# Patient Record
Sex: FEMALE | Race: WHITE | NOT HISPANIC OR LATINO | Employment: OTHER | URBAN - METROPOLITAN AREA
[De-identification: names, ages, dates, MRNs, and addresses within clinical notes are randomized per-mention and may not be internally consistent; named-entity substitution may affect disease eponyms.]

---

## 2017-01-03 ENCOUNTER — HOSPITAL ENCOUNTER (OUTPATIENT)
Dept: MAMMOGRAPHY | Facility: HOSPITAL | Age: 52
Discharge: HOME | End: 2017-01-03
Attending: FAMILY MEDICINE | Admitting: FAMILY MEDICINE

## 2017-01-03 DIAGNOSIS — Z12.31 VISIT FOR SCREENING MAMMOGRAM: ICD-10-CM

## 2017-01-03 PROCEDURE — 77063 BREAST TOMOSYNTHESIS BI: CPT | Performed by: RADIOLOGY

## 2017-01-03 PROCEDURE — 77063 BREAST TOMOSYNTHESIS BI: CPT

## 2017-01-03 PROCEDURE — 77067 SCR MAMMO BI INCL CAD: CPT | Performed by: RADIOLOGY

## 2017-01-03 PROCEDURE — G0202 SCR MAMMO BI INCL CAD: HCPCS

## 2017-01-20 ENCOUNTER — HOSPITAL ENCOUNTER (OUTPATIENT)
Dept: MAMMOGRAPHY | Facility: HOSPITAL | Age: 52
Discharge: HOME | End: 2017-01-20
Admitting: FAMILY MEDICINE

## 2017-01-20 ENCOUNTER — HOSPITAL ENCOUNTER (OUTPATIENT)
Dept: ULTRASOUND IMAGING | Facility: HOSPITAL | Age: 52
Discharge: HOME | End: 2017-01-20

## 2017-01-20 ENCOUNTER — TRANSCRIBE ORDERS (OUTPATIENT)
Dept: MAMMOGRAPHY | Facility: HOSPITAL | Age: 52
End: 2017-01-20

## 2017-01-20 DIAGNOSIS — R92.8 ABNORMAL MAMMOGRAM: ICD-10-CM

## 2017-01-20 DIAGNOSIS — R92.8 ABNORMAL MAMMOGRAM: Primary | ICD-10-CM

## 2017-01-20 PROCEDURE — 76642 ULTRASOUND BREAST LIMITED: CPT | Performed by: RADIOLOGY

## 2017-01-20 PROCEDURE — 77061 BREAST TOMOSYNTHESIS UNI: CPT | Performed by: RADIOLOGY

## 2017-01-20 PROCEDURE — 77065 DX MAMMO INCL CAD UNI: CPT | Performed by: RADIOLOGY

## 2017-01-20 PROCEDURE — 76642 ULTRASOUND BREAST LIMITED: CPT

## 2017-01-20 PROCEDURE — G0279 TOMOSYNTHESIS, MAMMO: HCPCS

## 2017-01-20 PROCEDURE — G0206 DX MAMMO INCL CAD UNI: HCPCS

## 2017-03-23 ENCOUNTER — OFFICE VISIT (OUTPATIENT)
Dept: PULMONOLOGY | Facility: CLINIC | Age: 52
End: 2017-03-23

## 2017-03-23 VITALS
HEIGHT: 67 IN | OXYGEN SATURATION: 99 % | WEIGHT: 160.2 LBS | BODY MASS INDEX: 25.15 KG/M2 | DIASTOLIC BLOOD PRESSURE: 80 MMHG | HEART RATE: 79 BPM | TEMPERATURE: 98.2 F | SYSTOLIC BLOOD PRESSURE: 118 MMHG | RESPIRATION RATE: 16 BRPM

## 2017-03-23 DIAGNOSIS — J38.3 VOCAL CORD DYSFUNCTION: ICD-10-CM

## 2017-03-23 DIAGNOSIS — J30.9 ALLERGIC RHINITIS, UNSPECIFIED ALLERGIC RHINITIS TRIGGER, UNSPECIFIED RHINITIS SEASONALITY: ICD-10-CM

## 2017-03-23 DIAGNOSIS — J45.30 MILD PERSISTENT ASTHMA WITHOUT COMPLICATION: Primary | ICD-10-CM

## 2017-03-23 PROCEDURE — 99213 OFFICE O/P EST LOW 20 MIN: CPT | Performed by: INTERNAL MEDICINE

## 2017-03-23 RX ORDER — PREDNISONE 20 MG/1
40 TABLET ORAL DAILY
Qty: 10 TABLET | Refills: 0 | Status: SHIPPED | OUTPATIENT
Start: 2017-03-23 | End: 2017-05-22

## 2017-03-23 NOTE — PROGRESS NOTES
"CHIEF COMPLAINT: f/u for Shortness of breath.     HISTORY OF PRESENT ILLNESS: The patient is a 51-year-old white female with history of fibromyalgia, allergic rhinitis, sinusitis, sinus surgery, osteoarthritis, asthma, irritable bowel syndrome who comes today for shortness of breath, cough, and allergies. These problems have been chronic and ongoing for a long time. Recently she reports a lot of sinus drainage and coughing. She also gets intermittent chest tightness and wheezing, and shortness of breath which is partially relieved by a rescue inhaler. She also feels like she has episodes that come on suddenly where her air gets cut-off suddenly more in her neck associated with voice changes. These tend to come and go suddenly. She also reports some intermittent heartburn, not currently on any treatment for that. The patient has a cat at home. She was also exposed to some sulfuric acid from computer batteries a couple of years ago, but she had breathing issues before then. No other complaints.     Interval History:  Last visit we changed her LABA/ICS to Breo, check a CBC for eosinophilia and IGE which were normal.  HRCT showed normal lung parenchyma.  She was referred to ENT at  for suspected Vocal Cord Dysfunction and this diagnosis was confirmed.  She has been doing well, she is going back to  for Speech Therapy.  She has only rarely had to use her rescue inhaler.  She did have one episode of \"bronchitis\" requiring steroids since last visit.    Past Medical, Family, Social History, Medications, Allergies, and Vitals Reviewed    ROS  All other systems were reviewed and negative except per HPI    Physical Exam   Constitutional: Oriented to person, place, and time. Appears well-developed and well-nourished.   Head: Normocephalic and atraumatic.   Nose: Nose normal.   Mouth/Throat: Oropharynx is clear and moist.   Eyes: Conjunctivae are normal.   Neck: No tracheal deviation present.   Cardiovascular: Normal rate, " "regular rhythm, normal heart sounds and intact distal pulses.  Exam reveals no gallop and no friction rub.    No murmur heard.  Pulmonary/Chest: Effort normal and breath sounds normal. No stridor. No respiratory distress. No wheezes. No rales. No tenderness.   Abdominal: Soft. Bowel sounds are normal. No distension. No tenderness. There is no guarding.   Musculoskeletal: Normal range of motion. No edema.   Lymphadenopathy:  No cervical adenopathy.   Neurological: Alert and oriented to person, place, and time.  No Focal Neurological Deficits Observed   Skin: Skin is warm and dry. No rash noted.   Psychiatric: Normal mood and affect.  Behavior is normal. Judgment normal.     HRCT reviewed and interpreted by me shows normal lung parenchyma    Impression & Plan    1)  Allergic Rhinitis - sees ENT, prior sinus surgery, on NSAID (doesn't seem to have a relationship w/ severity), on dymista, antihistamine, singulair    2)  Asthma - continue Breo, however since she is well controlled will decrease dose to 100.  Prescribe prednisone 40 mg x 5 days to have at home for \"action plan\".  CBC w/o eosinophilia, IGE normal.    3) Vocal Cord Dysfunction - started on PPI by  ENT, getting speech therapy    RTC 6 months    Fermín Hunter MD  Pulmonology and Critical Care Medicine  03/23/17 12:54 PM  Electronically Signed    "

## 2017-05-22 ENCOUNTER — OFFICE VISIT (OUTPATIENT)
Dept: GYNECOLOGIC ONCOLOGY | Facility: CLINIC | Age: 52
End: 2017-05-22

## 2017-05-22 VITALS
BODY MASS INDEX: 24.01 KG/M2 | HEIGHT: 67 IN | SYSTOLIC BLOOD PRESSURE: 108 MMHG | DIASTOLIC BLOOD PRESSURE: 66 MMHG | WEIGHT: 153 LBS | RESPIRATION RATE: 16 BRPM | HEART RATE: 77 BPM | OXYGEN SATURATION: 99 % | TEMPERATURE: 98.5 F

## 2017-05-22 DIAGNOSIS — Z01.419 WELL WOMAN EXAM WITH ROUTINE GYNECOLOGICAL EXAM: Primary | ICD-10-CM

## 2017-05-22 DIAGNOSIS — Z79.890 HORMONE REPLACEMENT THERAPY (HRT): ICD-10-CM

## 2017-05-22 PROCEDURE — 99396 PREV VISIT EST AGE 40-64: CPT | Performed by: NURSE PRACTITIONER

## 2017-05-22 RX ORDER — PAROXETINE HYDROCHLORIDE HEMIHYDRATE 37.5 MG/1
37.5 TABLET, FILM COATED, EXTENDED RELEASE ORAL EVERY MORNING
COMMUNITY

## 2017-05-22 RX ORDER — ESTRADIOL 2 MG/1
2 TABLET ORAL DAILY
Qty: 30 TABLET | Refills: 12 | Status: SHIPPED | OUTPATIENT
Start: 2017-05-22 | End: 2018-05-29

## 2017-07-21 ENCOUNTER — HOSPITAL ENCOUNTER (OUTPATIENT)
Dept: MAMMOGRAPHY | Facility: HOSPITAL | Age: 52
Discharge: HOME | End: 2017-07-21
Attending: FAMILY MEDICINE | Admitting: FAMILY MEDICINE

## 2017-07-21 DIAGNOSIS — R92.8 ABNORMAL MAMMOGRAM: ICD-10-CM

## 2017-07-21 PROCEDURE — 77065 DX MAMMO INCL CAD UNI: CPT | Performed by: RADIOLOGY

## 2017-07-21 PROCEDURE — G0279 TOMOSYNTHESIS, MAMMO: HCPCS

## 2017-07-21 PROCEDURE — 77061 BREAST TOMOSYNTHESIS UNI: CPT | Performed by: RADIOLOGY

## 2017-07-21 PROCEDURE — G0206 DX MAMMO INCL CAD UNI: HCPCS

## 2017-09-25 ENCOUNTER — OFFICE VISIT (OUTPATIENT)
Dept: PULMONOLOGY | Facility: CLINIC | Age: 52
End: 2017-09-25

## 2017-09-25 VITALS
WEIGHT: 158 LBS | HEIGHT: 67 IN | HEART RATE: 81 BPM | BODY MASS INDEX: 24.8 KG/M2 | TEMPERATURE: 98.1 F | OXYGEN SATURATION: 99 % | SYSTOLIC BLOOD PRESSURE: 100 MMHG | RESPIRATION RATE: 16 BRPM | DIASTOLIC BLOOD PRESSURE: 62 MMHG

## 2017-09-25 DIAGNOSIS — J45.30 MILD PERSISTENT ASTHMA WITHOUT COMPLICATION: ICD-10-CM

## 2017-09-25 DIAGNOSIS — J30.9 ALLERGIC RHINITIS, UNSPECIFIED ALLERGIC RHINITIS TRIGGER, UNSPECIFIED RHINITIS SEASONALITY: Primary | ICD-10-CM

## 2017-09-25 PROCEDURE — 86003 ALLG SPEC IGE CRUDE XTRC EA: CPT | Performed by: INTERNAL MEDICINE

## 2017-09-25 PROCEDURE — 36415 COLL VENOUS BLD VENIPUNCTURE: CPT | Performed by: INTERNAL MEDICINE

## 2017-09-25 PROCEDURE — 90471 IMMUNIZATION ADMIN: CPT | Performed by: INTERNAL MEDICINE

## 2017-09-25 PROCEDURE — 90686 IIV4 VACC NO PRSV 0.5 ML IM: CPT | Performed by: INTERNAL MEDICINE

## 2017-09-25 PROCEDURE — 99214 OFFICE O/P EST MOD 30 MIN: CPT | Performed by: INTERNAL MEDICINE

## 2017-09-25 RX ORDER — OMEPRAZOLE 20 MG/1
1 CAPSULE, DELAYED RELEASE ORAL DAILY
COMMUNITY
Start: 2017-09-03 | End: 2018-05-29

## 2017-09-25 RX ORDER — ALBUTEROL SULFATE 90 UG/1
2 POWDER, METERED RESPIRATORY (INHALATION) EVERY 6 HOURS PRN
COMMUNITY
Start: 2017-06-21 | End: 2017-09-25 | Stop reason: SDUPTHER

## 2017-09-25 RX ORDER — MECLIZINE HYDROCHLORIDE 25 MG/1
25 TABLET ORAL 3 TIMES DAILY PRN
Qty: 90 TABLET | Refills: 0 | Status: SHIPPED | OUTPATIENT
Start: 2017-09-25 | End: 2018-05-29

## 2017-09-25 RX ORDER — TRIAMTERENE AND HYDROCHLOROTHIAZIDE 37.5; 25 MG/1; MG/1
1 CAPSULE ORAL DAILY
COMMUNITY
Start: 2017-09-03

## 2017-09-25 RX ORDER — FLUTICASONE PROPIONATE 50 MCG
SPRAY, SUSPENSION (ML) NASAL
Qty: 1 BOTTLE | Refills: 11 | Status: SHIPPED | OUTPATIENT
Start: 2017-09-25

## 2017-09-25 RX ORDER — ALBUTEROL SULFATE 90 UG/1
2 POWDER, METERED RESPIRATORY (INHALATION) EVERY 4 HOURS PRN
Qty: 1 INHALER | Refills: 11 | Status: SHIPPED | OUTPATIENT
Start: 2017-09-25

## 2017-09-25 RX ORDER — AZELASTINE HCL 205.5 UG/1
SPRAY NASAL
Qty: 1 EACH | Refills: 11 | Status: SHIPPED | OUTPATIENT
Start: 2017-09-25 | End: 2018-09-24 | Stop reason: SDUPTHER

## 2017-09-25 RX ORDER — MONTELUKAST SODIUM 10 MG/1
10 TABLET ORAL NIGHTLY
Qty: 30 TABLET | Refills: 11 | Status: SHIPPED | OUTPATIENT
Start: 2017-09-25

## 2017-09-25 NOTE — PROGRESS NOTES
CHIEF COMPLAINT: f/u for Asthma / Allergies / Vocal Cord Dysfunction     HISTORY OF PRESENT ILLNESS: The patient is a 51-year-old white female with history of fibromyalgia, allergic rhinitis, sinusitis, sinus surgery, osteoarthritis, asthma, irritable bowel syndrome who comes today for shortness of breath, cough, and allergies. These problems have been chronic and ongoing for a long time. Recently she reports a lot of sinus drainage and coughing. She also gets intermittent chest tightness and wheezing, and shortness of breath which is partially relieved by a rescue inhaler. She also feels like she has episodes that come on suddenly where her air gets cut-off suddenly more in her neck associated with voice changes. These tend to come and go suddenly. She also reports some intermittent heartburn, not currently on any treatment for that. The patient has a cat at home. She was also exposed to some sulfuric acid from computer batteries a couple of years ago, but she had breathing issues before then. No other complaints.     She had a CBC for eosinophilia and IGE which were normal.  HRCT showed normal lung parenchyma.  She was referred to ENT at  for suspected Vocal Cord Dysfunction and this diagnosis was confirmed.  She completed speech therapy.    INTERVAL HISTORY:    Patient returns for follow up.  She no longer has symptoms of VCD.  She is taking a PPI as prescribed by ENT.  She recently ran out of dymista and is complaining of nasal congestion, sneezing, coughing (worse at night), congestion and vertigo (which she tends to get when her sinuses act up).  She has also had some wheezing and used her rescue inhaler once.  She is interested in allergy shots.  No other complaints.    Past Medical, Family, Social History, Medications, Allergies, and Vitals Reviewed    ROS  All other systems were reviewed and negative except per HPI    Physical Exam   Constitutional: Oriented to person, place, and time. Appears  well-developed and well-nourished.   Head: Normocephalic and atraumatic.   Nose: Nose normal.   Mouth/Throat: Oropharynx is clear and moist.   Eyes: Conjunctivae are normal.   Neck: No tracheal deviation present.   Cardiovascular: Normal rate, regular rhythm, normal heart sounds and intact distal pulses.  Exam reveals no gallop and no friction rub.    No murmur heard.  Pulmonary/Chest: Effort normal and breath sounds normal. No stridor. No respiratory distress. No wheezes. No rales. No tenderness.   Abdominal: Soft. Bowel sounds are normal. No distension. No tenderness. There is no guarding.   Musculoskeletal: Normal range of motion. No edema.   Lymphadenopathy:  No cervical adenopathy.   Neurological: Alert and oriented to person, place, and time.  No Focal Neurological Deficits Observed   Skin: Skin is warm and dry. No rash noted.   Psychiatric: Normal mood and affect.  Behavior is normal. Judgment normal.     DIAGNOSTIC DATA (Reviewed and Interpreted by me unless otherwise specified):    HRCT 11/14/16 normal lung parenchyma    PFT 11/4/16 mild restriciton, borderline mild reduction in dlco overcorrects for alveolar volume, poor gas mixing    Impression & Plan    1)  Allergic Rhinitis - sees ENT, prior sinus surgery, will refill anti-histamine / singulair / flonase / azelastine.  Will check a serum allergy panel and refer to allergy.  If no improvement my need re-evaluation for sinus surgery.      2)  Asthma - continue Breo 100, generally well controlled with rare use of rescue inhaler    3) Vocal Cord Dysfunction - started on PPI by  ENT, completed speech therapy.  Rarely symptomatic.    4) Vertigo - likely related to allergic rhinitis and eustachian tube dysfunction, prn meclizine    Flu shot today    RTC 3 months    Fermín Hunter MD  Pulmonology and Critical Care Medicine  09/25/17 1:33 PM  Electronically Signed

## 2017-09-28 LAB
A ALTERNATA IGE QN: <0.1 KU/L
A FUMIGATUS IGE QN: <0.1 KU/L
AMER ROACH IGE QN: <0.1 KU/L
BAHIA GRASS IGE QN: <0.1 KU/L
BERMUDA GRASS IGE QN: <0.1 KU/L
BOXELDER IGE QN: <0.1 KU/L
C HERBARUM IGE QN: <0.1 KU/L
CAT DANDER IGG QN: <0.1 KU/L
CMN PIGWEED IGE QN: <0.1 KU/L
COMMON RAGWEED IGE QN: <0.1 KU/L
CONV CLASS DESCRIPTION: NORMAL
D FARINAE IGE QN: <0.1 KU/L
D PTERONYSS IGE QN: <0.1 KU/L
DOG DANDER IGE QN: <0.1 KU/L
ENGL PLANTAIN IGE QN: <0.1 KU/L
HAZELNUT POLN IGE QN: <0.1 KU/L
JOHNSON GRASS IGE QN: <0.1 KU/L
KENT BLUE GRASS IGE QN: <0.1 KU/L
M RACEMOSUS IGE QN: <0.1 KU/L
MT JUNIPER IGE QN: <0.1 KU/L
MUGWORT IGE QN: <0.1 KU/L
NETTLE IGE QN: <0.1 KU/L
P NOTATUM IGE QN: <0.1 KU/L
S BOTRYOSUM IGE QN: <0.1 KU/L
SHEEP SORREL IGE QN: <0.1 KU/L
SWEET GUM IGE QN: <0.1 KU/L
T011-IGE MAPLE LEAF SYCAMORE: <0.1 KU/L
WHITE ELM IGE QN: <0.1 KU/L
WHITE HICKORY IGE QN: <0.1 KU/L
WHITE MULBERRY IGE QN: <0.1 KU/L
WHITE OAK IGE QN: <0.1 KU/L

## 2018-03-05 ENCOUNTER — TRANSCRIBE ORDERS (OUTPATIENT)
Dept: ADMINISTRATIVE | Facility: HOSPITAL | Age: 53
End: 2018-03-05

## 2018-03-05 DIAGNOSIS — Z12.31 VISIT FOR SCREENING MAMMOGRAM: Primary | ICD-10-CM

## 2018-03-16 ENCOUNTER — TRANSCRIBE ORDERS (OUTPATIENT)
Dept: ADMINISTRATIVE | Facility: HOSPITAL | Age: 53
End: 2018-03-16

## 2018-03-16 DIAGNOSIS — R19.03 RIGHT LOWER QUADRANT ABDOMINAL MASS: Primary | ICD-10-CM

## 2018-03-21 ENCOUNTER — HOSPITAL ENCOUNTER (OUTPATIENT)
Dept: MAMMOGRAPHY | Facility: HOSPITAL | Age: 53
Discharge: HOME | End: 2018-03-21
Attending: FAMILY MEDICINE

## 2018-03-29 ENCOUNTER — HOSPITAL ENCOUNTER (OUTPATIENT)
Dept: ULTRASOUND IMAGING | Facility: HOSPITAL | Age: 53
Discharge: HOME | End: 2018-03-29
Attending: FAMILY MEDICINE | Admitting: FAMILY MEDICINE

## 2018-03-29 DIAGNOSIS — R19.03 RIGHT LOWER QUADRANT ABDOMINAL MASS: ICD-10-CM

## 2018-03-29 PROCEDURE — 76705 ECHO EXAM OF ABDOMEN: CPT

## 2018-04-05 ENCOUNTER — HOSPITAL ENCOUNTER (OUTPATIENT)
Dept: MAMMOGRAPHY | Facility: HOSPITAL | Age: 53
Discharge: HOME | End: 2018-04-05
Attending: FAMILY MEDICINE | Admitting: FAMILY MEDICINE

## 2018-04-05 DIAGNOSIS — Z12.31 VISIT FOR SCREENING MAMMOGRAM: ICD-10-CM

## 2018-04-05 PROCEDURE — 77063 BREAST TOMOSYNTHESIS BI: CPT | Performed by: RADIOLOGY

## 2018-04-05 PROCEDURE — 77067 SCR MAMMO BI INCL CAD: CPT | Performed by: RADIOLOGY

## 2018-04-05 PROCEDURE — 77067 SCR MAMMO BI INCL CAD: CPT

## 2018-04-05 PROCEDURE — 77063 BREAST TOMOSYNTHESIS BI: CPT

## 2018-04-17 ENCOUNTER — HOSPITAL ENCOUNTER (OUTPATIENT)
Dept: MAMMOGRAPHY | Facility: HOSPITAL | Age: 53
Discharge: HOME | End: 2018-04-17
Admitting: FAMILY MEDICINE

## 2018-04-17 DIAGNOSIS — R92.8 ABNORMAL MAMMOGRAM: ICD-10-CM

## 2018-04-17 PROCEDURE — 77062 BREAST TOMOSYNTHESIS BI: CPT | Performed by: RADIOLOGY

## 2018-04-17 PROCEDURE — G0279 TOMOSYNTHESIS, MAMMO: HCPCS

## 2018-04-17 PROCEDURE — 77066 DX MAMMO INCL CAD BI: CPT

## 2018-04-17 PROCEDURE — 77066 DX MAMMO INCL CAD BI: CPT | Performed by: RADIOLOGY

## 2018-04-18 ENCOUNTER — APPOINTMENT (OUTPATIENT)
Dept: MAMMOGRAPHY | Facility: HOSPITAL | Age: 53
End: 2018-04-18

## 2018-05-29 ENCOUNTER — OFFICE VISIT (OUTPATIENT)
Dept: GYNECOLOGIC ONCOLOGY | Facility: CLINIC | Age: 53
End: 2018-05-29

## 2018-05-29 VITALS
OXYGEN SATURATION: 98 % | DIASTOLIC BLOOD PRESSURE: 52 MMHG | WEIGHT: 160 LBS | HEART RATE: 83 BPM | HEIGHT: 67 IN | TEMPERATURE: 97.7 F | RESPIRATION RATE: 16 BRPM | SYSTOLIC BLOOD PRESSURE: 90 MMHG | BODY MASS INDEX: 25.11 KG/M2

## 2018-05-29 DIAGNOSIS — Z01.419 WELL WOMAN EXAM WITH ROUTINE GYNECOLOGICAL EXAM: Primary | ICD-10-CM

## 2018-05-29 DIAGNOSIS — Z79.890 HORMONE REPLACEMENT THERAPY (HRT): ICD-10-CM

## 2018-05-29 PROCEDURE — 99396 PREV VISIT EST AGE 40-64: CPT | Performed by: NURSE PRACTITIONER

## 2018-05-29 RX ORDER — ESTERIFIED ESTROGEN AND METHYLTESTOSTERONE .625; 1.25 MG/1; MG/1
1 TABLET ORAL DAILY
Qty: 30 TABLET | Refills: 5 | Status: SHIPPED | OUTPATIENT
Start: 2018-05-29 | End: 2018-12-04 | Stop reason: SDUPTHER

## 2018-05-29 NOTE — PROGRESS NOTES
GYN ONCOLOGY ANNUAL WELL WOMAN VISIT      Domo Anderson  [id number]  1965      Chief Complaint: Gynecologic Exam (wants to switch back to estratest due to night sweats and decreased libido. )        History of present illness:  Domo Anderson is a 53 y.o. year old female who is here today for an annual exam. She has a personal history of AUB and POP, s/p AD/BSO/ASC/[place]aly in  and laparoscopic CYN with mesh revision in . She took Estratest for many years following surgery, but last year requested to change her HRT due to c/o ongoing night sweats and did not feel she needed the testosterone component due to pending divorce. She was changed to Estrace 2 mg PO daily.  Upon arrival today, she requests to change back to Estratest. Her divorce should soon be finalized and she is in a new relationship. She did have STD testing after finding out about multiple affairs, which was all negative. Her grown children are very supportive.   Otherwise, she is feeling very well today. She denies vaginal bleeding, pelvic pain, concerning lesions, breast concerns, or changes in bowel or bladder function. All well woman screenings are currently UTD.         Obstetric History:  OB History      Para Term  AB Living    4 3 3          SAB TAB Ectopic Molar Multiple Live Births                        Menstrual History:     No LMP recorded. Patient has had a hysterectomy.          Past Medical History:   Diagnosis Date   • Allergic rhinitis    • Anxiety    • Asthma    • Fibromyalgia    • Hx of uterine prolapse    • Hyperlipidemia    • IBS (irritable bowel syndrome)    • Osteoarthritis    • Vocal cord dysfunction        Past Surgical History:   Procedure Laterality Date   • ABDOMINAL SURGERY      hysterectomy   • AUGMENTATION MAMMAPLASTY Bilateral     SALINE   • HYSTERECTOMY      AGE 38   • OOPHORECTOMY Bilateral     AGE 38   • SINUS SURGERY Right     deviated septum   • VAGINAL MESH REVISION    "      MEDICATIONS: The current medication list was reviewed and reconciled.     Allergies:  has No Known Allergies.    Family History   Problem Relation Age of Onset   • Ovarian cancer Neg Hx    • Breast cancer Neg Hx        Health Maintenance:  Last mammogram was 4/2018. Last colonoscopy was 3/2015, with recommended follow-up in 5 year(s). Last DEXA was 2014. Last pap smear was 2017, results were  normal PAP..      Review of Systems   Constitutional: Negative for fatigue, fever and unexpected weight change.   HENT: Negative for congestion, ear pain, hearing loss, sinus pressure and trouble swallowing.    Eyes: Negative for visual disturbance.   Respiratory: Negative for cough, chest tightness, shortness of breath and wheezing.    Cardiovascular: Negative for chest pain, palpitations and leg swelling.   Gastrointestinal: Negative for abdominal distention, abdominal pain, constipation, diarrhea, nausea and vomiting.   Endocrine: Negative for cold intolerance, heat intolerance, polydipsia, polyphagia and polyuria.   Genitourinary: Negative for difficulty urinating, dyspareunia, dysuria, frequency, hematuria, pelvic pain, urgency, vaginal bleeding, vaginal discharge and vaginal pain.   Musculoskeletal: Negative for arthralgias, gait problem, joint swelling and myalgias.   Skin: Negative for color change, pallor and rash.   Neurological: Negative for dizziness, seizures, syncope, weakness, light-headedness, numbness and headaches.   Hematological: Negative for adenopathy. Does not bruise/bleed easily.   Psychiatric/Behavioral: Negative for agitation, confusion, sleep disturbance and suicidal ideas. The patient is not nervous/anxious.        Physical Exam  Vital Signs: BP 90/52   Pulse 83   Temp 97.7 °F (36.5 °C) (Oral)   Resp 16   Ht 170.2 cm (67\")   Wt 72.6 kg (160 lb)   SpO2 98%   BMI 25.06 kg/m²    General Appearance:  alert, cooperative, no apparent distress, appears stated age and normal weight "   Neurologic/Psychiatric: A&O x 3, gait steady, appropriate affect   HEENT:  Normocephalic, without obvious abnormality, mucous membranes moist   Neck: Supple, symmetrical, trachea midline, no adenopathy;  No thyromegaly, masses, or tenderness   Back:   Symmetric, no curvature, ROM normal, no CVA tenderness   Lungs:   Clear to auscultation bilaterally; respirations regular, even, and unlabored bilaterally   Heart:  Regular rate and rhythm, no murmurs appreciated   Breasts:  Symmetrical, no masses, no lesions, no nipple discharge and implants noted bilaterally   Abdomen:   Soft, non-tender, non-distended and no organomegaly   Lymph nodes: No cervical, supraclavicular, inguinal or axillary adenopathy noted   Extremities: Normal, atraumatic; no clubbing, cyanosis, or edema    Skin: No rashes, ulcers, or suspicious lesions noted   Pelvic: External Genitalia  without lesions or skin changes  Vagina  is pink, moist, without lesions.   Vaginal Cuff  Female Vaginal Cuff: smooth, intact, without visible lesions and pap obtained  Uterus  surgically absent  Ovaries  surgically absent bilaterallly  Parametria  smooth  Rectovaginal  Female rectovaginal: deferred         Procedure Note:  No notes on file    Assessment and Plan:    Domo was seen today for gynecologic exam.    Diagnoses and all orders for this visit:    Well woman exam with routine gynecological exam  -     Pap IG, Rfx HPV ASCU; Future    Hormone replacement therapy (HRT)    Other orders  -     estrogens, conjugated,-methyltestosterone (ESTRATEST HS) 0.625-1.25 MG per tablet; Take 1 tablet by mouth Daily.        Pap was done today.  If she does not receive the results of the Pap within 2 weeks  time, she was instructed to call to find out the results.  I explained to Domo that the recommendations for Pap smear interval in a low risk patient's has lengthened to 3 years time.  I encouraged her to be seen yearly for a full physical exam including breast and pelvic  exam even during the off years when PAP's will not be performed.    She was encouraged to get yearly mammograms.  She should report any palpable breast lump(s) or skin changes regardless of mammographic findings.  I explained to Domo that notification regarding her mammogram results will come from the center performing the study.  Our office will not be routinely calling with mammogram results.  It is her responsibility to make sure that the results from the mammogram are communicated to her by the breast center.  If she has any questions about the results, she is welcome to call our office anytime.    Repeat colonoscopy in 2020. Repeat DEXA at age 58.     HRT changed back to Estratest per patient request. If happy with this, may call for refills in 6 months.     RTC 1 year for Annual exam or PRN.      DANYEL Bush      Note: Speech recognition transcription software was used to dictate portions of this document.  An attempt at proofreading has been made though minor errors in transcription may still be present.  Please do not hesitate to call our office with any questions.

## 2018-07-02 ENCOUNTER — TRANSCRIBE ORDERS (OUTPATIENT)
Dept: PULMONOLOGY | Facility: CLINIC | Age: 53
End: 2018-07-02

## 2018-07-30 ENCOUNTER — OFFICE VISIT (OUTPATIENT)
Dept: PULMONOLOGY | Facility: CLINIC | Age: 53
End: 2018-07-30

## 2018-07-30 VITALS
SYSTOLIC BLOOD PRESSURE: 110 MMHG | WEIGHT: 170.2 LBS | HEIGHT: 67 IN | BODY MASS INDEX: 26.71 KG/M2 | DIASTOLIC BLOOD PRESSURE: 72 MMHG | HEART RATE: 67 BPM | RESPIRATION RATE: 18 BRPM | OXYGEN SATURATION: 97 % | TEMPERATURE: 97.7 F

## 2018-07-30 DIAGNOSIS — J45.31 MILD PERSISTENT ASTHMA WITH ACUTE EXACERBATION: Primary | ICD-10-CM

## 2018-07-30 DIAGNOSIS — J30.9 ALLERGIC RHINITIS, UNSPECIFIED CHRONICITY, UNSPECIFIED SEASONALITY, UNSPECIFIED TRIGGER: ICD-10-CM

## 2018-07-30 DIAGNOSIS — J38.3 VOCAL CORD DYSFUNCTION: ICD-10-CM

## 2018-07-30 PROCEDURE — 99214 OFFICE O/P EST MOD 30 MIN: CPT | Performed by: NURSE PRACTITIONER

## 2018-07-30 RX ORDER — AZITHROMYCIN 250 MG/1
TABLET, FILM COATED ORAL
COMMUNITY
Start: 2018-07-27 | End: 2018-12-05

## 2018-07-30 RX ORDER — METHYLPREDNISOLONE 4 MG/1
TABLET ORAL
COMMUNITY
Start: 2018-07-27 | End: 2018-12-05

## 2018-07-30 RX ORDER — LEVOFLOXACIN 500 MG/1
TABLET, FILM COATED ORAL
COMMUNITY
Start: 2018-06-25 | End: 2018-12-05

## 2018-07-30 RX ORDER — ALBUTEROL SULFATE 1.25 MG/3ML
SOLUTION RESPIRATORY (INHALATION)
COMMUNITY
Start: 2018-07-27

## 2018-07-30 NOTE — PROGRESS NOTES
[facility] Pulmonary Follow up    CHIEF COMPLAINT    Asthma, worsening shortness of breath    HISTORY OF PRESENT ILLNESS    Domo Anderson is a 53 y.o.female lifetime nonsmoker with a history of asthma, allergic rhinitis, and vocal cord dysfunction here today for worsening shortness of breath.    She last saw Dr. Fermín Hunter in September 2017.  She was doing fairly well until about a month ago.  She saw her PCP when she was having asthma attack.  She received antibiotics, steroids, and a nebulizer treatment in the office.  She returned to her PCP last week and was told she is having another exacerbation. She was prescribed another round of prednisone as well as erythromycin.  Her PCP arranged for her to have a nebulizer machine and nebulized albuterol for home use.  Despite antibiotics, steroids, and neb treatments, she continues to complain of shortness of breath, chest tightness, and a nonproductive cough.  She notes that her cough is worse at night.  She denies any symptoms of reflux.    She remains on Breo, Singulair, Zyrtec, Flonase, and Astepro.  She was evaluated by the allergist a week or so ago but was unable to have allergy testing performed while she was still on her antihistamine.  She does not feel like her congestion or drainage is worse in the last month.    She has a history of vocal cord dysfunction.  She was seen by  ENT and completed speech therapy.  She feels like her shortness of breath she is currently experiencing is worse than what she felt with her vocal cord dysfunction.      Patient Active Problem List   Diagnosis   • Mild persistent asthma with acute exacerbation   • Allergic rhinitis   • Vocal cord dysfunction       No Known Allergies    Current Outpatient Prescriptions:   •  albuterol (ACCUNEB) 1.25 MG/3ML nebulizer solution, , Disp: , Rfl:   •  azelastine (ASTEPRO) 0.15 % solution nasal spray, 2 sprays q nostril bid, Disp: 1 each, Rfl: 11  •  azithromycin (ZITHROMAX) 250 MG tablet,  , Disp: , Rfl:   •  celecoxib (CeleBREX) 200 MG capsule, Take 200 mg by mouth 2 (Two) Times a Day., Disp: , Rfl:   •  Cetirizine HCl 10 MG capsule, Take  by mouth Daily., Disp: , Rfl:   •  estrogens, conjugated,-methyltestosterone (ESTRATEST HS) 0.625-1.25 MG per tablet, Take 1 tablet by mouth Daily., Disp: 30 tablet, Rfl: 5  •  fluticasone (FLONASE) 50 MCG/ACT nasal spray, 2 sprays in each nostril bid, Disp: 1 bottle, Rfl: 11  •  Fluticasone Furoate-Vilanterol 100-25 MCG/INH aerosol powder , Inhale 1 puff Daily., Disp: 1 each, Rfl: 11  •  levoFLOXacin (LEVAQUIN) 500 MG tablet, , Disp: , Rfl:   •  Linaclotide (LINZESS) 145 MCG capsule, Take 1 capsule by mouth Daily., Disp: , Rfl:   •  MethylPREDNISolone (MEDROL, PHILIP,) 4 MG tablet, , Disp: , Rfl:   •  montelukast (SINGULAIR) 10 MG tablet, Take 1 tablet by mouth Every Night., Disp: 30 tablet, Rfl: 11  •  PARoxetine CR (PAXIL-CR) 37.5 MG 24 hr tablet, Take 37.5 mg by mouth Every Morning., Disp: , Rfl:   •  PROAIR RESPICLICK 108 (90 Base) MCG/ACT inhaler, Inhale 2 puffs Every 4 (Four) Hours As Needed for Wheezing or Shortness of Air., Disp: 1 inhaler, Rfl: 11  •  traZODone (DESYREL) 50 MG tablet, Take 50 mg by mouth Every Night., Disp: , Rfl:   •  triamterene-hydrochlorothiazide (DYAZIDE) 37.5-25 MG per capsule, Take 1 capsule by mouth Daily., Disp: , Rfl:   •  Tiotropium Bromide Monohydrate (SPIRIVA RESPIMAT) 1.25 MCG/ACT aerosol solution inhaler, Inhale 2 puffs Daily., Disp: 1 inhaler, Rfl: 1  MEDICATION LIST AND ALLERGIES REVIEWED.    Social History   Substance Use Topics   • Smoking status: Never Smoker   • Smokeless tobacco: Never Used   • Alcohol use Yes      Comment: Socially       FAMILY AND SOCIAL HISTORY REVIEWED.    Review of Systems   Constitutional: Negative for chills, fatigue, fever and unexpected weight change.   HENT: Positive for congestion. Negative for nosebleeds, postnasal drip, rhinorrhea, sinus pressure and trouble swallowing.    Respiratory:  "Positive for cough, chest tightness, shortness of breath and wheezing.    Cardiovascular: Negative for chest pain and leg swelling.   Gastrointestinal: Negative for abdominal pain, constipation, diarrhea, nausea and vomiting.   Genitourinary: Negative for dysuria, frequency, hematuria and urgency.   Musculoskeletal: Negative for myalgias.   Neurological: Negative for dizziness, weakness, numbness and headaches.   All other systems reviewed and are negative.  .    /72   Pulse 67   Temp 97.7 °F (36.5 °C)   Resp 18   Ht 170.2 cm (67\")   Wt 77.2 kg (170 lb 3.2 oz)   SpO2 97% Comment: RA  BMI 26.66 kg/m²     Immunization History   Administered Date(s) Administered   • Influenza, Unspecified 09/25/2017       Physical Exam   Constitutional: She is oriented to person, place, and time. No distress.   HENT:   Head: Normocephalic and atraumatic.   Neck: Neck supple. No tracheal deviation present.   Cardiovascular: Normal rate and regular rhythm.    No murmur heard.  Pulmonary/Chest: Effort normal. No stridor. No respiratory distress. She has no wheezes. She has no rales.   Musculoskeletal: Normal range of motion. She exhibits no edema.   Lymphadenopathy:     She has no cervical adenopathy.   Neurological: She is alert and oriented to person, place, and time.   Skin: Skin is dry.   Psychiatric: She has a normal mood and affect. Her behavior is normal.   Vitals reviewed.        RESULTS        PROBLEM LIST    Problem List Items Addressed This Visit        Respiratory    Allergic rhinitis    Vocal cord dysfunction       Other    Mild persistent asthma with acute exacerbation - Primary            DISCUSSION    Asthma- continue Breo and albuterol.  Complete antibiotic and prednisone as prescribed by PCP.  I do not hear any wheezing today to suggest she needs a prolonged course of steroids.  We discussed possibly increasing her ICS dose versus adding on Spiriva asthma dose.  We'll try adding Spiriva.  She was provided " with samples and educated on use of the inhaler.    Allergic rhinitis- follow-up with allergist as scheduled.    Vocal cord dysfunction- she has completed speech therapy.  She was on a PPI but only took this for about one month.  Could consider resuming PPI if cough persists.     I spent 25 minutes with the patient. I spent > 50% percent of this time counseling and discussing diagnosis, current status and treatment options.    DANYEL Collado  07/30/20182:17 PM  Electronically signed     Please note that portions of this note were completed with a voice recognition program. Efforts were made to edit the dictations, but occasionally words are mistranscribed.      CC: Andrews Castellon MD

## 2018-09-24 RX ORDER — AZELASTINE HCL 205.5 UG/1
SPRAY NASAL
Qty: 30 ML | Refills: 11 | Status: SHIPPED | OUTPATIENT
Start: 2018-09-24

## 2018-10-01 ENCOUNTER — TELEPHONE (OUTPATIENT)
Dept: OBSTETRICS AND GYNECOLOGY | Facility: CLINIC | Age: 53
End: 2018-10-01

## 2018-10-02 ENCOUNTER — APPOINTMENT (OUTPATIENT)
Dept: CT IMAGING | Facility: HOSPITAL | Age: 53
End: 2018-10-02

## 2018-10-02 ENCOUNTER — TRANSCRIBE ORDERS (OUTPATIENT)
Dept: ADMINISTRATIVE | Facility: HOSPITAL | Age: 53
End: 2018-10-02

## 2018-10-02 ENCOUNTER — HOSPITAL ENCOUNTER (OUTPATIENT)
Dept: CT IMAGING | Facility: HOSPITAL | Age: 53
Discharge: HOME | End: 2018-10-02
Admitting: FAMILY MEDICINE

## 2018-10-02 DIAGNOSIS — R10.31 RLQ ABDOMINAL PAIN: Primary | ICD-10-CM

## 2018-10-02 DIAGNOSIS — R10.31 RLQ ABDOMINAL PAIN: ICD-10-CM

## 2018-10-02 PROCEDURE — 74176 CT ABD & PELVIS W/O CONTRAST: CPT

## 2018-11-05 ENCOUNTER — OFFICE VISIT (OUTPATIENT)
Dept: PULMONOLOGY | Facility: CLINIC | Age: 53
End: 2018-11-05

## 2018-11-05 VITALS
SYSTOLIC BLOOD PRESSURE: 128 MMHG | BODY MASS INDEX: 27 KG/M2 | DIASTOLIC BLOOD PRESSURE: 92 MMHG | TEMPERATURE: 97.3 F | OXYGEN SATURATION: 99 % | RESPIRATION RATE: 16 BRPM | HEART RATE: 84 BPM | HEIGHT: 67 IN | WEIGHT: 172 LBS

## 2018-11-05 DIAGNOSIS — J45.31 MILD PERSISTENT ASTHMA WITH ACUTE EXACERBATION: Primary | ICD-10-CM

## 2018-11-05 DIAGNOSIS — J30.9 ALLERGIC RHINITIS, UNSPECIFIED SEASONALITY, UNSPECIFIED TRIGGER: ICD-10-CM

## 2018-11-05 DIAGNOSIS — J38.3 VOCAL CORD DYSFUNCTION: ICD-10-CM

## 2018-11-05 LAB
BASOPHILS # BLD AUTO: 0.01 10*3/MM3 (ref 0–0.2)
BASOPHILS NFR BLD AUTO: 0.1 % (ref 0–1)
DEPRECATED RDW RBC AUTO: 41.6 FL (ref 37–54)
EOSINOPHIL # BLD AUTO: 0.04 10*3/MM3 (ref 0–0.3)
EOSINOPHIL NFR BLD AUTO: 0.5 % (ref 0–3)
ERYTHROCYTE [DISTWIDTH] IN BLOOD BY AUTOMATED COUNT: 12.3 % (ref 11.3–14.5)
HCT VFR BLD AUTO: 43.4 % (ref 34.5–44)
HGB BLD-MCNC: 14.6 G/DL (ref 11.5–15.5)
IMM GRANULOCYTES # BLD: 0.01 10*3/MM3 (ref 0–0.03)
IMM GRANULOCYTES NFR BLD: 0.1 % (ref 0–0.6)
LYMPHOCYTES # BLD AUTO: 1.35 10*3/MM3 (ref 0.6–4.8)
LYMPHOCYTES NFR BLD AUTO: 18.1 % (ref 24–44)
MCH RBC QN AUTO: 30.9 PG (ref 27–31)
MCHC RBC AUTO-ENTMCNC: 33.6 G/DL (ref 32–36)
MCV RBC AUTO: 91.8 FL (ref 80–99)
MONOCYTES # BLD AUTO: 0.56 10*3/MM3 (ref 0–1)
MONOCYTES NFR BLD AUTO: 7.5 % (ref 0–12)
NEUTROPHILS # BLD AUTO: 5.48 10*3/MM3 (ref 1.5–8.3)
NEUTROPHILS NFR BLD AUTO: 73.8 % (ref 41–71)
PLATELET # BLD AUTO: 338 10*3/MM3 (ref 150–450)
PMV BLD AUTO: 9.5 FL (ref 6–12)
RBC # BLD AUTO: 4.73 10*6/MM3 (ref 3.89–5.14)
WBC NRBC COR # BLD: 7.44 10*3/MM3 (ref 3.5–10.8)

## 2018-11-05 PROCEDURE — 86003 ALLG SPEC IGE CRUDE XTRC EA: CPT | Performed by: NURSE PRACTITIONER

## 2018-11-05 PROCEDURE — 82785 ASSAY OF IGE: CPT | Performed by: NURSE PRACTITIONER

## 2018-11-05 PROCEDURE — 85025 COMPLETE CBC W/AUTO DIFF WBC: CPT | Performed by: NURSE PRACTITIONER

## 2018-11-05 PROCEDURE — 99214 OFFICE O/P EST MOD 30 MIN: CPT | Performed by: NURSE PRACTITIONER

## 2018-11-05 PROCEDURE — 36415 COLL VENOUS BLD VENIPUNCTURE: CPT | Performed by: NURSE PRACTITIONER

## 2018-11-05 NOTE — PROGRESS NOTES
[facility], [place], operated by [facility] Follow up    CHIEF COMPLAINT    Asthma    HISTORY OF PRESENT ILLNESS    Domo Anderson is a 53 y.o.female lifetime nonsmoker with asthma, vocal cord dysfunction, questionable allergic rhinitis here today for follow-up.    I last saw her at the end of July.  At that time she had just completed a few rounds of antibiotics and steroids for asthma exacerbations.  She had also been set up with a nebulizer machine and nebulized albuterol.  Despite antibiotics, steroids, and neb treatment, she continued to complain of shortness of breath, chest tightness, and a nonproductive cough.  She was on Breo and we added Spiriva asthma dose at that visit.  She did notice temporary improvement.  However in the last week or so she has begun feeling ill again with increased wheezing and chest tightness.  She is currently on her second round of antibiotics prescribed by her PCP.  She is not on prednisone.  Her PCP instructed her to hold Spiriva while ill.  She has not used this about 4 days.    She has a questionable history of allergic rhinitis.  Serum allergy testing performed in our office in September 2017 was negative.  She was referred to an allergist but was unable to have allergy testing done because she was still on her antihistamine.  She discontinued use of several medications but apparently there was still an issue with allergy testing.  If she needs to still see an allergist, she would like to be referred to a different provider.     She has a history of vocal cord dysfunction.  She was seen by  ENT and completed speech therapy.  She feels like her shortness of breath she is currently experiencing is worse than what she felt with her vocal cord dysfunction.    Patient Active Problem List   Diagnosis   • Mild persistent asthma with acute exacerbation   • Allergic rhinitis   • Vocal cord dysfunction       No Known Allergies    Current Outpatient Prescriptions:   •  albuterol (ACCUNEB) 1.25 MG/3ML nebulizer  solution, , Disp: , Rfl:   •  azelastine (ASTEPRO) 0.15 % solution nasal spray, instill 2 sprays into each nostril twice a day, Disp: 30 mL, Rfl: 11  •  azithromycin (ZITHROMAX) 250 MG tablet, , Disp: , Rfl:   •  celecoxib (CeleBREX) 200 MG capsule, Take 200 mg by mouth 2 (Two) Times a Day., Disp: , Rfl:   •  Cetirizine HCl 10 MG capsule, Take  by mouth Daily., Disp: , Rfl:   •  estrogens, conjugated,-methyltestosterone (ESTRATEST HS) 0.625-1.25 MG per tablet, Take 1 tablet by mouth Daily., Disp: 30 tablet, Rfl: 5  •  fluticasone (FLONASE) 50 MCG/ACT nasal spray, 2 sprays in each nostril bid, Disp: 1 bottle, Rfl: 11  •  Fluticasone Furoate-Vilanterol 100-25 MCG/INH aerosol powder , Inhale 1 puff Daily., Disp: 1 each, Rfl: 11  •  levoFLOXacin (LEVAQUIN) 500 MG tablet, , Disp: , Rfl:   •  Linaclotide (LINZESS) 145 MCG capsule, Take 1 capsule by mouth Daily., Disp: , Rfl:   •  MethylPREDNISolone (MEDROL, PHILIP,) 4 MG tablet, , Disp: , Rfl:   •  montelukast (SINGULAIR) 10 MG tablet, Take 1 tablet by mouth Every Night., Disp: 30 tablet, Rfl: 11  •  PARoxetine CR (PAXIL-CR) 37.5 MG 24 hr tablet, Take 37.5 mg by mouth Every Morning., Disp: , Rfl:   •  PROAIR RESPICLICK 108 (90 Base) MCG/ACT inhaler, Inhale 2 puffs Every 4 (Four) Hours As Needed for Wheezing or Shortness of Air., Disp: 1 inhaler, Rfl: 11  •  Tiotropium Bromide Monohydrate (SPIRIVA RESPIMAT) 1.25 MCG/ACT aerosol solution inhaler, Inhale 2 puffs Daily., Disp: 1 inhaler, Rfl: 1  •  traZODone (DESYREL) 50 MG tablet, Take 50 mg by mouth Every Night., Disp: , Rfl:   •  triamterene-hydrochlorothiazide (DYAZIDE) 37.5-25 MG per capsule, Take 1 capsule by mouth Daily., Disp: , Rfl:   MEDICATION LIST AND ALLERGIES REVIEWED.    Social History   Substance Use Topics   • Smoking status: Never Smoker   • Smokeless tobacco: Never Used   • Alcohol use Yes      Comment: Socially       FAMILY AND SOCIAL HISTORY REVIEWED.    Review of Systems   Constitutional: Negative for  "chills, fatigue, fever and unexpected weight change.   HENT: Positive for congestion. Negative for nosebleeds, postnasal drip, rhinorrhea, sinus pressure and trouble swallowing.    Respiratory: Positive for cough, chest tightness, shortness of breath and wheezing.    Cardiovascular: Negative for chest pain and leg swelling.   Gastrointestinal: Negative for abdominal pain, constipation, diarrhea, nausea and vomiting.   Genitourinary: Negative for dysuria, frequency, hematuria and urgency.   Musculoskeletal: Negative for myalgias.   Neurological: Negative for dizziness, weakness, numbness and headaches.   All other systems reviewed and are negative.  .    /92 Comment: LUE  Pulse 84   Temp 97.3 °F (36.3 °C) (Temporal Artery )   Resp 16   Ht 170.2 cm (67\")   Wt 78 kg (172 lb)   SpO2 99% Comment: RA  BMI 26.94 kg/m²     Immunization History   Administered Date(s) Administered   • Influenza, Unspecified 09/25/2017       Physical Exam   Constitutional: She is oriented to person, place, and time. No distress.   HENT:   Head: Normocephalic and atraumatic.   Neck: Neck supple. No tracheal deviation present.   Cardiovascular: Normal rate and regular rhythm.    No murmur heard.  Pulmonary/Chest: Effort normal. No stridor. No respiratory distress. She has no wheezes. She has no rales.   Musculoskeletal: Normal range of motion. She exhibits no edema.   Lymphadenopathy:     She has no cervical adenopathy.   Neurological: She is alert and oriented to person, place, and time.   Skin: Skin is dry.   Psychiatric: She has a normal mood and affect. Her behavior is normal.   Vitals reviewed.        RESULTS        PROBLEM LIST    Problem List Items Addressed This Visit        Respiratory    Mild persistent asthma with acute exacerbation - Primary    Relevant Orders    CBC & Differential    Allergens, Zone 8    IgE Level    CBC Auto Differential    Allergic rhinitis    Vocal cord dysfunction            DISCUSSION    Asthma- " continue Breo and albuterol.  Complete antibiotic prescribed by PCP.  I do not hear any wheezing today to suggest she needs a prolonged course of steroids.  I have asked her to start using nebulized albuterol each evening as that is when her chest tightness is typically worse.  We'll recheck a CBC with differential to evaluate for eosinophilia as well as repeat IgE to see if she would be a candidate for an add on biologic therapy.     Allergic rhinitis- repeat allergy testing.  Refer to new allergist if needed.     Vocal cord dysfunction- she has completed speech therapy.  She was on a PPI but only took this for about one month.  She does not think she has much in the way of reflux symptoms    Follow-up in 4 weeks.    I spent 25 minutes with the patient. I spent > 50% percent of this time counseling and discussing diagnosis, diagnostic testing, current status and treatment options.    Ricarda Chan, APRN  11/05/20182:17 PM  Electronically signed     Please note that portions of this note were completed with a voice recognition program. Efforts were made to edit the dictations, but occasionally words are mistranscribed.      CC: Andrews Castellon MD

## 2018-11-13 LAB
A ALTERNATA IGE QN: <0.1 KU/L
A FUMIGATUS IGE QN: <0.1 KU/L
AMER ROACH IGE QN: <0.1 KU/L
BAHIA GRASS IGE QN: <0.1 KU/L
BERMUDA GRASS IGE QN: <0.1 KU/L
BOXELDER IGE QN: <0.1 KU/L
C HERBARUM IGE QN: <0.1 KU/L
CAT DANDER IGG QN: <0.1 KU/L
CMN PIGWEED IGE QN: <0.1 KU/L
COMMON RAGWEED IGE QN: <0.1 KU/L
CONV CLASS DESCRIPTION: NORMAL
D FARINAE IGE QN: <0.1 KU/L
D PTERONYSS IGE QN: <0.1 KU/L
DOG DANDER IGE QN: <0.1 KU/L
ENGL PLANTAIN IGE QN: <0.1 KU/L
HAZELNUT POLN IGE QN: NORMAL KU/L
JOHNSON GRASS IGE QN: <0.1 KU/L
KENT BLUE GRASS IGE QN: <0.1 KU/L
M RACEMOSUS IGE QN: NORMAL KU/L
MT JUNIPER IGE QN: <0.1 KU/L
MUGWORT IGE QN: <0.1 KU/L
NETTLE IGE QN: NORMAL KU/L
P NOTATUM IGE QN: <0.1 KU/L
S BOTRYOSUM IGE QN: NORMAL KU/L
SHEEP SORREL IGE QN: NORMAL KU/L
SWEET GUM IGE QN: NORMAL KU/L
T011-IGE MAPLE LEAF SYCAMORE: NORMAL KU/L
TOTAL IGE SMQN RAST: 46 IU/ML (ref 0–100)
WHITE ELM IGE QN: <0.1 KU/L
WHITE HICKORY IGE QN: NORMAL KU/L
WHITE MULBERRY IGE QN: NORMAL KU/L
WHITE OAK IGE QN: <0.1 KU/L

## 2018-12-05 ENCOUNTER — TELEPHONE (OUTPATIENT)
Dept: GYNECOLOGIC ONCOLOGY | Facility: CLINIC | Age: 53
End: 2018-12-05

## 2018-12-05 ENCOUNTER — OFFICE VISIT (OUTPATIENT)
Dept: PULMONOLOGY | Facility: CLINIC | Age: 53
End: 2018-12-05

## 2018-12-05 VITALS
SYSTOLIC BLOOD PRESSURE: 116 MMHG | HEIGHT: 67 IN | BODY MASS INDEX: 27.15 KG/M2 | HEART RATE: 76 BPM | TEMPERATURE: 98.2 F | DIASTOLIC BLOOD PRESSURE: 80 MMHG | OXYGEN SATURATION: 97 % | WEIGHT: 173 LBS

## 2018-12-05 DIAGNOSIS — J38.3 VOCAL CORD DYSFUNCTION: ICD-10-CM

## 2018-12-05 DIAGNOSIS — J30.9 ALLERGIC RHINITIS, UNSPECIFIED SEASONALITY, UNSPECIFIED TRIGGER: ICD-10-CM

## 2018-12-05 DIAGNOSIS — J45.31 MILD PERSISTENT ASTHMA WITH ACUTE EXACERBATION: Primary | ICD-10-CM

## 2018-12-05 DIAGNOSIS — J32.9 RECURRENT SINUS INFECTIONS: ICD-10-CM

## 2018-12-05 PROCEDURE — 99214 OFFICE O/P EST MOD 30 MIN: CPT | Performed by: NURSE PRACTITIONER

## 2018-12-05 PROCEDURE — 94375 RESPIRATORY FLOW VOLUME LOOP: CPT | Performed by: NURSE PRACTITIONER

## 2018-12-05 NOTE — PROGRESS NOTES
[facility] Follow up    CHIEF COMPLAINT    Asthma    HISTORY OF PRESENT ILLNESS    Domo Anderson is a 53 y.o.female lifetime nonsmoker with a history of asthma, allergic rhinitis, and vocal cord dysfunction here today for follow-up.     She's had numerous exacerbations over the last 4-5 months requiring several rounds of antibiotics and steroids but reports she has had little to no improvement. She continues to have wheezing on a daily basis and a persistent cough. She denies symptoms of reflux and feels like her cough is mostly from postnasal drainage. She remains on [id number], Singulair, Flonase, and Astepro. She briefly tried Spiriva Asthma but discontinued use. CBC was negative for eosinophilia.  IgE was normal.  Allergen panel was negative. She has a history of sinusitis with previous sinus surgery.  Her last CT of the sinuses was in 2016. She also had a negative HRCT in 2016.     She has a history of vocal cord dysfunction.  She was seen by  ENT and completed speech therapy.  She feels like her shortness of breath she is currently experiencing is worse than what she felt with her vocal cord dysfunction.    Patient Active Problem List   Diagnosis   • Mild persistent asthma without complication   • Allergic rhinitis   • Vocal cord dysfunction       No Known Allergies    Current Outpatient Medications:   •  albuterol (ACCUNEB) 1.25 MG/3ML nebulizer solution, , Disp: , Rfl:   •  azelastine (ASTEPRO) 0.15 % solution nasal spray, instill 2 sprays into each nostril twice a day, Disp: 30 mL, Rfl: 11  •  BREO ELLIPTA 100-25 MCG/INH aerosol powder , inhale 1 puff by mouth once daily, Disp: 60 each, Rfl: 2  •  celecoxib (CeleBREX) 200 MG capsule, Take 200 mg by mouth 2 (Two) Times a Day., Disp: , Rfl:   •  Cetirizine HCl 10 MG capsule, Take  by mouth Daily., Disp: , Rfl:   •  EST ESTROGENS-METHYLTEST HS 0.625-1.25 MG per tablet, take 1 tablet by mouth once daily, Disp: 30 tablet, Rfl: 5  •  fluticasone (FLONASE)  "50 MCG/ACT nasal spray, 2 sprays in each nostril bid, Disp: 1 bottle, Rfl: 11  •  Linaclotide (LINZESS) 145 MCG capsule, Take 1 capsule by mouth Daily., Disp: , Rfl:   •  montelukast (SINGULAIR) 10 MG tablet, Take 1 tablet by mouth Every Night., Disp: 30 tablet, Rfl: 11  •  PARoxetine CR (PAXIL-CR) 37.5 MG 24 hr tablet, Take 37.5 mg by mouth Every Morning., Disp: , Rfl:   •  PROAIR RESPICLICK 108 (90 Base) MCG/ACT inhaler, Inhale 2 puffs Every 4 (Four) Hours As Needed for Wheezing or Shortness of Air., Disp: 1 inhaler, Rfl: 11  •  traZODone (DESYREL) 50 MG tablet, Take 50 mg by mouth Every Night., Disp: , Rfl:   •  triamterene-hydrochlorothiazide (DYAZIDE) 37.5-25 MG per capsule, Take 1 capsule by mouth Daily., Disp: , Rfl:   MEDICATION LIST AND ALLERGIES REVIEWED.    Social History     Tobacco Use   • Smoking status: Never Smoker   • Smokeless tobacco: Never Used   Substance Use Topics   • Alcohol use: Yes     Comment: Socially   • Drug use: No       FAMILY AND SOCIAL HISTORY REVIEWED.    Review of Systems   Constitutional: Negative for chills, fatigue, fever and unexpected weight change.   HENT: Positive for congestion and postnasal drip. Negative for nosebleeds, rhinorrhea, sinus pressure and trouble swallowing.    Respiratory: Positive for cough, chest tightness, shortness of breath and wheezing.    Cardiovascular: Negative for chest pain and leg swelling.   Gastrointestinal: Negative for abdominal pain, constipation, diarrhea, nausea and vomiting.   Genitourinary: Negative for dysuria, frequency, hematuria and urgency.   Musculoskeletal: Negative for myalgias.   Neurological: Negative for dizziness, weakness, numbness and headaches.   All other systems reviewed and are negative.  .    /80   Pulse 76   Temp 98.2 °F (36.8 °C)   Ht 170.2 cm (67\")   Wt 78.5 kg (173 lb)   SpO2 97% Comment: resting, RA  BMI 27.10 kg/m²     Immunization History   Administered Date(s) Administered   • Influenza, Unspecified " 09/25/2017, 11/05/2018       Physical Exam   Constitutional: She is oriented to person, place, and time. No distress.   HENT:   Head: Normocephalic and atraumatic.   Neck: Neck supple. No tracheal deviation present.   Cardiovascular: Normal rate and regular rhythm.   No murmur heard.  Pulmonary/Chest: Effort normal. No stridor. No respiratory distress. She has no wheezes. She has no rales.   Musculoskeletal: Normal range of motion. She exhibits no edema.   Lymphadenopathy:     She has no cervical adenopathy.   Neurological: She is alert and oriented to person, place, and time.   Skin: Skin is dry.   Psychiatric: She has a normal mood and affect. Her behavior is normal.   Vitals reviewed.        RESULTS    PFTS in the office today, read by me: No obstruction.     PROBLEM LIST    Problem List Items Addressed This Visit        Respiratory    Mild persistent asthma without complication - Primary    Allergic rhinitis    Vocal cord dysfunction      Other Visit Diagnoses     Recurrent sinus infections        Relevant Orders    CT Sinus Without Contrast            DISCUSSION    Asthma- Continue Breo and albuterol    Allergic rhinitis- persistent nasal drainage despite Flonase, Astepro, and Singulair. Has a history of sinusitis with previous sinus surgery. CT sinuses and consider referral back to ENT    Vocal cord dysfunction- completed speech therapy.  Rarely symptomatic. Previously on PPI but denies reflux symptoms.     Follow up with Dr. Fermín Hunter in 3 months.       Ricarda Chan, DANYEL  12/05/201810:18 AM  Electronically signed     Please note that portions of this note were completed with a voice recognition program. Efforts were made to edit the dictations, but occasionally words are mistranscribed.      CC: Andrews Castellon MD

## 2018-12-20 ENCOUNTER — TRANSCRIBE ORDERS (OUTPATIENT)
Dept: PAIN MEDICINE | Facility: CLINIC | Age: 53
End: 2018-12-20

## 2018-12-20 DIAGNOSIS — R10.9 ABDOMINAL PAIN, UNSPECIFIED ABDOMINAL LOCATION: Primary | ICD-10-CM

## 2019-01-02 ENCOUNTER — TELEPHONE (OUTPATIENT)
Dept: PAIN MEDICINE | Facility: CLINIC | Age: 54
End: 2019-01-02

## 2019-02-07 PROBLEM — Z98.890 HISTORY OF ABDOMINOPLASTY: Status: ACTIVE | Noted: 2019-02-07

## 2019-02-07 PROBLEM — G57.81 ENTRAPMENT OF RIGHT ILIOINGUINAL NERVE: Status: ACTIVE | Noted: 2019-02-07

## 2019-02-07 NOTE — PROGRESS NOTES
"Chief Complaint: \"Pain on the right side of my lower abdomen.\"       History of Present Illness:   Patient: Ms. Domo Anderson, 54 y.o. female   Referring physician: Dr. Liz Lindsay   Reason for referral: Consultation for intractable chronic right lower quadrant abdominal pain.   Pain history: Patient reports a 6-year history of abdominal pain, which began without incident. Patient has a history of numerous abdominal surgeries including after several abdominal surgeries including an abdominoplasty. Pain has progressed in intensity over the past 1 year.   Pain description: constant pain with intermittent exacerbation, described as aching, dull and throbbing sensation.   Radiation of pain: The pain does not radiate.  Pain intensity today: 5/10  Average pain intensity last week: 3/10  Pain intensity ranges from: 3/10 to 8/10  Aggravating factors: The patient is unable to indentify factors that exacerbate or alleviate the pain.  Alleviating factors: None    Associated symptoms:   Patient denies pain, numbness or weakness in the lower extremities.   Patient denies any new bladder or bowel problems.   Patient denies difficulties with her balance or recent falls.      Review of previous therapies and additional medical records:  Domo Anderson has already failed the following measures, including:   Conservative measures: oral analgesics, physical therapy   Interventional measures: None  Surgical measures: Total abdominal hysterectomy with bladder suspention and rectocele repair with mesh placement,  3 years later she had surgery for removal of mesh, abdominoplasty   Domo Anderson underwent surgical consultation with Dr. Lindsay on 12/10/2018, and was found not to be a surgical candidate.  Domo Anderson presents with significant comorbidities including anxiety and depression, engaged in treatment., asthma engaged in treatment.  In terms of current analgesics, Domo Anderson takes: Celebrex and Tylenol. Patient also takes " Paxil and trazodone  I have reviewed [facility] Report #[id number] consistent to medication reconciliation.     Global Pain Scale 02-12 2019                   Pain  10                 Feelings  6                 Clinical outcomes  5                 Activities  3                 GPS Total:  24                    Review of Diagnostic Studies:    CT ABDOMEN AND PELVIS 10/02/2018  PANCREATICOHEPATOBILIARY: The liver is enlarged without a focal intrahepatic mass or abnormality. No significant intra-or extrahepatic ductal dilation. Gallbladder, pancreas and spleen are unremarkable.  GENITOURINARY: No adrenal mass. Mild renal cortical scarring.   Kidneys are otherwise unremarkable without obstructive renal/ureteric calculi  and no hydronephrosis. Partially distended/empty urinary bladder. Uterus is not visualized consistent with hysterectomy.  No free fluid in the pelvis.   GASTROINTESTINAL: Predominantly sigmoid diverticulosis. Colon contains moderate amount of fecal matter suggestive of constipation. A small size hiatal hernia with probable esophagitis/reflux. No free intraperitoneal air or fluid collection. APPENDIX is not visualized.  OTHER STRUCTURES: Aorta appears unremarkable without evidence of aortic aneurysm. Normal size and mildly enlarged mesenteric, retroperitoneal lymph nodes without bulky alecia enlargement. Platelike atelectasis and scarring in  the lung bases. Scoliosis and mild degenerative changes in the spine.  ULTRASOUND ABDOMEN 03/29/2018: In the right lower quadrant no specific structures could be identified. Specifically, the appendix was not ultrasonographically visible. Images of the rightward aspect of the pelvis were limited due to bowel gas, but likewise reveals no abnormality    Review of Systems   HENT: Positive for congestion, sinus pressure and trouble swallowing.    Respiratory: Positive for shortness of breath and wheezing.    Gastrointestinal: Positive for abdominal distention, abdominal pain,  constipation and diarrhea.   Genitourinary: Positive for pelvic pain.   Musculoskeletal: Positive for arthralgias and myalgias.   Allergic/Immunologic: Positive for environmental allergies.   All other systems reviewed and are negative.        Patient Active Problem List   Diagnosis   • Mild persistent asthma without complication   • Allergic rhinitis   • Vocal cord dysfunction   • History of abdominoplasty   • Entrapment of right ilioinguinal nerve   • Anxiety and depression   • Fibromyalgia       Past Medical History:   Diagnosis Date   • Allergic rhinitis    • Anxiety    • Asthma    • Fibromyalgia    • Hx of uterine prolapse    • Hyperlipidemia    • IBS (irritable bowel syndrome)    • Osteoarthritis    • Vocal cord dysfunction          Past Surgical History:   Procedure Laterality Date   • ABDOMINAL SURGERY      hysterectomy   • AUGMENTATION MAMMAPLASTY Bilateral 2014    SALINE   • HIP SURGERY  2017    left   • HYSTERECTOMY      AGE 38   • OOPHORECTOMY Bilateral     AGE 38   • SINUS SURGERY Right 2006    deviated septum   • VAGINAL MESH REVISION           Family History   Problem Relation Age of Onset   • Ovarian cancer Neg Hx    • Breast cancer Neg Hx          Social History     Socioeconomic History   • Marital status: Legally      Spouse name: Not on file   • Number of children: Not on file   • Years of education: Not on file   • Highest education level: Not on file   Tobacco Use   • Smoking status: Never Smoker   • Smokeless tobacco: Never Used   Substance and Sexual Activity   • Alcohol use: Yes     Comment: Socially   • Drug use: No   • Sexual activity: Defer           Current Outpatient Medications:   •  albuterol (ACCUNEB) 1.25 MG/3ML nebulizer solution, , Disp: , Rfl:   •  azelastine (ASTEPRO) 0.15 % solution nasal spray, instill 2 sprays into each nostril twice a day, Disp: 30 mL, Rfl: 11  •  BREO ELLIPTA 100-25 MCG/INH aerosol powder , inhale 1 puff by mouth once daily, Disp: 60 each, Rfl:  "2  •  celecoxib (CeleBREX) 200 MG capsule, Take 200 mg by mouth 2 (Two) Times a Day., Disp: , Rfl:   •  Cetirizine HCl 10 MG capsule, Take  by mouth Daily., Disp: , Rfl:   •  EST ESTROGENS-METHYLTEST HS 0.625-1.25 MG per tablet, take 1 tablet by mouth once daily, Disp: 30 tablet, Rfl: 5  •  fluticasone (FLONASE) 50 MCG/ACT nasal spray, 2 sprays in each nostril bid, Disp: 1 bottle, Rfl: 11  •  Linaclotide (LINZESS) 145 MCG capsule, Take 1 capsule by mouth Daily., Disp: , Rfl:   •  montelukast (SINGULAIR) 10 MG tablet, Take 1 tablet by mouth Every Night., Disp: 30 tablet, Rfl: 11  •  PARoxetine CR (PAXIL-CR) 37.5 MG 24 hr tablet, Take 37.5 mg by mouth Every Morning., Disp: , Rfl:   •  PROAIR RESPICLICK 108 (90 Base) MCG/ACT inhaler, Inhale 2 puffs Every 4 (Four) Hours As Needed for Wheezing or Shortness of Air., Disp: 1 inhaler, Rfl: 11  •  traZODone (DESYREL) 50 MG tablet, Take 50 mg by mouth Every Night., Disp: , Rfl:   •  triamterene-hydrochlorothiazide (DYAZIDE) 37.5-25 MG per capsule, Take 1 capsule by mouth Daily., Disp: , Rfl:       No Known Allergies      /80   Pulse 72   Temp 97.5 °F (36.4 °C) (Temporal)   Resp 18   Ht 170.2 cm (67\")   Wt 78.9 kg (174 lb)   SpO2 99%   BMI 27.25 kg/m²       Physical Exam:  Constitutional: Patient is oriented to person, place, and time. Patient appears well-developed and well-nourished.   HEENT: Head: Normocephalic and atraumatic. Eyes: Conjunctivae and lids are normal. Pupils: Equal, round, reactive to light.   Neck: Trachea normal. Neck supple. No JVD present.   Pulmonary Respiratory effort: No increased work of breathing or signs of respiratory distress. Auscultation of lungs: Clear to auscultation.   Cardiovascular Auscultation of heart: Normal rate and rhythm, normal S1 and S2, no murmurs.   Abdomen: The abdomen was soft and nontender. Bowel sounds were normal. There is some tenderness to superficial palpation in the right LQ. Patient reports hypoesthesia. "   Lymphatic: Right: No inguinal adenopathy present. Left: No inguinal adenopathy present.    Musculoskeletal   Gait and station: Gait evaluation demonstrated a normal gait   Lumbar spine: Passive and active range of motion are full and without pain. Extension, flexion, lateral flexion, rotation of the lumbar spine did not increase or reproduce pain. Lumbar facet joint loading maneuvers are negative.   Giles and Gaenslen's tests are negative   Piriformis maneuvers are negative   Palpation of the bilateral ischial tuberosities, unrevealing   Palpation of the bilateral greater trochanter, unrevealing   Examination of the Iliotibial band: unrevealing   Hip joints: The range of motion of the hip joints is full and without pain   Neurological: Patient is alert and oriented to person, place, and time. Speech: speech is normal. Cortical function: Normal mental status.   Cranial nerves: Cranial nerves 2-12 intact.   Reflex Scores:  Right patellar: 2+  Left patellar: 2+  Right Achilles: 2+  Left Achilles: 2+  Motor strength: 5/5  Motor Tone: normal tone.   Involuntary movements: none.   Superficial/Primitive Reflexes: primitive reflexes were absent.   Right Mon: absent  Left Mon: absent  Right ankle clonus: absent  Left ankle clonus: absent   Negative long tract signs. Straight leg raising test is negative. Femoral stretch sign is negative.   Sensation: No sensory loss. Sensory exam: intact to light touch, intact pain and temperature sensation, intact vibration sensation and normal proprioception.   Coordination: Normal finger to nose and heel to shin. Normal balance and negative. Romberg's sign negative.   Skin and subcutaneous tissue: Skin is warm and intact. No rash noted. No cyanosis.   Psychiatric: Judgment and insight: Normal. Orientation to person, place and time: Normal. Recent and remote memory: Intact. Mood and affect: Normal.     ASSESSMENT:   1. Entrapment of right ilioinguinal nerve    2. History of  abdominoplasty    3. Fibromyalgia    4. Mild persistent asthma without complication    5. Anxiety and depression      PLAN/MEDICAL DECISION MAKING: I had a lengthy conversation with Ms. Domo Anderson regarding her chronic pain condition and potential therapeutic options including risks, benefits, alternative therapies, to name a few. Patient has failed to obtain pain relief with conservative measures, as referenced above. I have reviewed all available patient's medical records as well as previous therapies as referenced above.  Therefore, I have proposed the following plan:  1. Pharmacological measures: Reviewed. Discussed.   A. Patient takes Celebrex and Tylenol. Patient also takes Paxil and trazodone. Patient had side effects from Lyrica in the past  B. Trial with Rheumate one tablet twice daily (samples), then, continue once daily  C. Start pyridoxine 25 mg one tablet by mouth three times daily  D. Trial with prilocaine 2%, lidocaine 10%, imipramine 3%, capsaicin 0.001% and mannitol 20%  cream, apply 1 to 2 grams of cream to the affected areas every 4 to 6 hours as needed  2. Interventional pain management measures: Patient will be scheduled for diagnostic and therapeutic right ilioinguinal nerve block under ultrasound guidance by hydrodissection technique. We may repeat blocks depending on outcome. Otherwise, we may consider a spinal cord stimulator trial vs DRG trial with [place]. Patient has received an educational DVD on spinal cord stimulation therapies.  3. Long-term rehabilitation efforts:  A. The patient does not have a history of falls. I did complete a risk assessment for falls.   B. Continue a comprehensive physical therapy program for therapeutic exercise, neurodynamics, desensitization techniques, ultrasound, ASTYM, myofascial release, cupping and dry needling   C. Start an exercise program such as yoga, Pilates and water therapy  D. Trial with TENS unit/interferential unit  E. Contrast therapy:  Apply ice-packs for 15-20 minutes, followed by heating pads for 15-20 minutes to affected area   F. Patient has declined a referral to Dr. Lety Mcmanus for cognitive behavioral therapy at this time.  4. The patient has been instructed to contact my office with any questions or difficulties. The patient understands the plan and agrees to proceed accordingly.         Patient Care Team:  Andrews Castellon MD as PCP - General (Family Medicine)  Liz Lindsay MD as Consulting Physician (Colon and Rectal Surgery)  Brianna West APRN as Nurse Practitioner (Nurse Practitioner)     No orders of the defined types were placed in this encounter.        Future Appointments   Date Time Provider Department Center   6/3/2019  9:30 AM Brianna West APRN [facility] None         Dean Hyman MD     EM[facility] Dragon/Transcription disclaimer:  Much of this encounter note is an electronic transcription of spoken language to printed text. Electronic transcription of spoken language may permit erroneous, or at times, nonsensical words or phrases to be inadvertently transcribed. Although I have reviewed the note for such errors, some may still exist.

## 2019-02-12 ENCOUNTER — OFFICE VISIT (OUTPATIENT)
Dept: PAIN MEDICINE | Facility: CLINIC | Age: 54
End: 2019-02-12

## 2019-02-12 VITALS
HEIGHT: 67 IN | WEIGHT: 174 LBS | TEMPERATURE: 97.5 F | RESPIRATION RATE: 18 BRPM | DIASTOLIC BLOOD PRESSURE: 80 MMHG | SYSTOLIC BLOOD PRESSURE: 118 MMHG | HEART RATE: 72 BPM | BODY MASS INDEX: 27.31 KG/M2 | OXYGEN SATURATION: 99 %

## 2019-02-12 DIAGNOSIS — M79.7 FIBROMYALGIA: ICD-10-CM

## 2019-02-12 DIAGNOSIS — Z98.890 HISTORY OF ABDOMINOPLASTY: ICD-10-CM

## 2019-02-12 DIAGNOSIS — G57.81 ENTRAPMENT OF RIGHT ILIOINGUINAL NERVE: Primary | ICD-10-CM

## 2019-02-12 DIAGNOSIS — J45.30 MILD PERSISTENT ASTHMA WITHOUT COMPLICATION: ICD-10-CM

## 2019-02-12 DIAGNOSIS — G57.81 ENTRAPMENT OF RIGHT ILIOINGUINAL NERVE: ICD-10-CM

## 2019-02-12 DIAGNOSIS — F32.A ANXIETY AND DEPRESSION: ICD-10-CM

## 2019-02-12 DIAGNOSIS — F41.9 ANXIETY AND DEPRESSION: ICD-10-CM

## 2019-02-12 PROCEDURE — 99203 OFFICE O/P NEW LOW 30 MIN: CPT | Performed by: ANESTHESIOLOGY

## 2019-02-12 RX ORDER — PYRIDOXINE HCL (VITAMIN B6) 25 MG
25 TABLET ORAL 3 TIMES DAILY
Qty: 90 TABLET | Refills: 5 | Status: SHIPPED | OUTPATIENT
Start: 2019-02-12 | End: 2021-08-27

## 2019-02-12 RX ORDER — ME-TETRAHYDROFOLATE/B12/HRB236 1-1-500 MG
CAPSULE ORAL
Qty: 180 CAPSULE | Refills: 1 | Status: SHIPPED | OUTPATIENT
Start: 2019-02-12 | End: 2019-04-06

## 2019-03-06 ENCOUNTER — OUTSIDE FACILITY SERVICE (OUTPATIENT)
Dept: PAIN MEDICINE | Facility: CLINIC | Age: 54
End: 2019-03-06

## 2019-03-06 PROCEDURE — 76942 ECHO GUIDE FOR BIOPSY: CPT | Performed by: ANESTHESIOLOGY

## 2019-03-06 PROCEDURE — 64425 NJX AA&/STRD II IH NERVES: CPT | Performed by: ANESTHESIOLOGY

## 2019-03-06 PROCEDURE — 99152 MOD SED SAME PHYS/QHP 5/>YRS: CPT | Performed by: ANESTHESIOLOGY

## 2019-03-07 ENCOUNTER — TELEPHONE (OUTPATIENT)
Dept: PAIN MEDICINE | Facility: CLINIC | Age: 54
End: 2019-03-07

## 2019-03-07 NOTE — TELEPHONE ENCOUNTER
Patient had dx/tx right ilioinguinal nerve block on 03/06/19. Called patient to f/u post procedure. Reached voicemail. Left message for return call

## 2019-03-22 NOTE — PROGRESS NOTES
"Chief Complaint: \"Doing well.\"     History of Present Illness:   Patient: Ms. Domo Anderson, 54 y.o. female, with a 6-year history of chronic right lower quadrant abdominal pain, which began without incident.  She returns today for post-procedure follow-up.  Patient was last seen on 03/06/2019 for a right ilioinguinal nerve block and experienced 90-95% relief that is ongoing.  Patient has not participated in Physical Therapy, as medically advised.     Pain description: Previously constant pain with intermittent exacerbation, described as aching, dull and throbbing sensation.   Radiation of pain: The pain does not radiate.  Pain intensity today: 1/10  Average pain intensity last week: 1/10  Pain intensity ranges from: 0/10 to 1/10  Aggravating factors: The patient is unable to indentify factors that exacerbate or alleviate the pain.  Alleviating factors: None    Associated symptoms:   Patient denies pain, numbness, or weakness in the lower extremities.   Patient denies any new bladder or bowel problems.   Patient denies difficulties with her balance or recent falls.      Review of previous therapies and additional medical records:  Domo Anderson has already failed the following measures, including:   Conservative measures: oral analgesics, physical therapy   Interventional measures: As referenced above.  Surgical measures: Total abdominal hysterectomy with bladder suspention and rectocele repair with mesh placement.  3 years later, she had surgery for removal of mesh and abdominoplasty.   Domo Anderson underwent surgical consultation with Dr. Lindsay on 12/10/2018, and was found not to be a surgical candidate.  Domo Anderson presents with significant comorbidities including anxiety and depression, engaged in treatment., asthma engaged in treatment.  In terms of current analgesics, Domo Anderson takes: Celebrex, Tylenol.  I have reviewed [facility] Report #[id number] consistent to medication reconciliation.     Global Pain " Scale 02-12 2019 03-26 2019               Pain  10  2               Feelings  6  0               Clinical outcomes  5  0               Activities  3  0               GPS Total:  24  2                  Review of Diagnostic Studies:    CT ABDOMEN AND PELVIS 10/02/2018:  PANCREATICOHEPATOBILIARY: The liver is enlarged without a focal intrahepatic mass or abnormality. No significant intra-or extrahepatic ductal dilation. Gallbladder, pancreas and spleen are unremarkable.  GENITOURINARY: No adrenal mass. Mild renal cortical scarring.   Kidneys are otherwise unremarkable without obstructive renal/ureteric calculi  and no hydronephrosis. Partially distended/empty urinary bladder. Uterus is not visualized consistent with hysterectomy.  No free fluid in the pelvis.   GASTROINTESTINAL: Predominantly sigmoid diverticulosis. Colon contains moderate amount of fecal matter suggestive of constipation. A small size hiatal hernia with probable esophagitis/reflux. No free intraperitoneal air or fluid collection. APPENDIX is not visualized.  OTHER STRUCTURES: Aorta appears unremarkable without evidence of aortic aneurysm. Normal size and mildly enlarged mesenteric, retroperitoneal lymph nodes without bulky alecia enlargement. Platelike atelectasis and scarring in  the lung bases. Scoliosis and mild degenerative changes in the spine.  ULTRASOUND ABDOMEN 03/29/2018: In the right lower quadrant no specific structures could be identified. Specifically, the appendix was not ultrasonographically visible. Images of the rightward aspect of the pelvis were limited due to bowel gas, but likewise reveals no abnormality    Review of Systems   All other systems reviewed and are negative.        Patient Active Problem List   Diagnosis   • Mild persistent asthma without complication   • Allergic rhinitis   • Vocal cord dysfunction   • History of abdominoplasty   • Entrapment of right ilioinguinal nerve   • Anxiety and depression   • Fibromyalgia        Past Medical History:   Diagnosis Date   • Allergic rhinitis    • Anxiety    • Asthma    • Fibromyalgia    • Hx of uterine prolapse    • Hyperlipidemia    • IBS (irritable bowel syndrome)    • Osteoarthritis    • Vocal cord dysfunction          Past Surgical History:   Procedure Laterality Date   • ABDOMINAL SURGERY      hysterectomy   • AUGMENTATION MAMMAPLASTY Bilateral 2014    SALINE   • HIP SURGERY  2017    left   • HYSTERECTOMY      AGE 38   • OOPHORECTOMY Bilateral     AGE 38   • SINUS SURGERY Right 2006    deviated septum   • VAGINAL MESH REVISION           Family History   Problem Relation Age of Onset   • Ovarian cancer Neg Hx    • Breast cancer Neg Hx          Social History     Socioeconomic History   • Marital status: Legally      Spouse name: Not on file   • Number of children: Not on file   • Years of education: Not on file   • Highest education level: Not on file   Tobacco Use   • Smoking status: Never Smoker   • Smokeless tobacco: Never Used   Substance and Sexual Activity   • Alcohol use: Yes     Comment: Socially   • Drug use: No   • Sexual activity: Defer           Current Outpatient Medications:   •  albuterol (ACCUNEB) 1.25 MG/3ML nebulizer solution, , Disp: , Rfl:   •  azelastine (ASTEPRO) 0.15 % solution nasal spray, instill 2 sprays into each nostril twice a day, Disp: 30 mL, Rfl: 11  •  BREO ELLIPTA 100-25 MCG/INH aerosol powder , inhale 1 puff by mouth once daily, Disp: 60 each, Rfl: 2  •  celecoxib (CeleBREX) 200 MG capsule, Take 200 mg by mouth 2 (Two) Times a Day., Disp: , Rfl:   •  Cetirizine HCl 10 MG capsule, Take  by mouth Daily., Disp: , Rfl:   •  Dietary Management Product (RHEUMATE) capsule, Take 1 capsule twice daily, Disp: 180 capsule, Rfl: 1  •  EST ESTROGENS-METHYLTEST HS 0.625-1.25 MG per tablet, take 1 tablet by mouth once daily, Disp: 30 tablet, Rfl: 5  •  fluticasone (FLONASE) 50 MCG/ACT nasal spray, 2 sprays in each nostril bid, Disp: 1 bottle, Rfl: 11  •   "Gel Base gel, CAPSAICIN 0.001% IMIPRAMINE 3% LIDOCAINE 10% PRILOCAINE 2% MANNITOL 20%. APPLY 1-2 G TO AFFECTED AREA 3-4 TIMES DAILY, Disp: 240 g, Rfl: PRN  •  Linaclotide (LINZESS) 145 MCG capsule, Take 1 capsule by mouth Daily., Disp: , Rfl:   •  montelukast (SINGULAIR) 10 MG tablet, Take 1 tablet by mouth Every Night., Disp: 30 tablet, Rfl: 11  •  PARoxetine CR (PAXIL-CR) 37.5 MG 24 hr tablet, Take 37.5 mg by mouth Every Morning., Disp: , Rfl:   •  PROAIR RESPICLICK 108 (90 Base) MCG/ACT inhaler, Inhale 2 puffs Every 4 (Four) Hours As Needed for Wheezing or Shortness of Air., Disp: 1 inhaler, Rfl: 11  •  pyridoxine (VITAMIN B-6) 25 MG tablet, Take 1 tablet by mouth 3 (Three) Times a Day., Disp: 90 tablet, Rfl: 5  •  traZODone (DESYREL) 50 MG tablet, Take 50 mg by mouth Every Night., Disp: , Rfl:   •  triamterene-hydrochlorothiazide (DYAZIDE) 37.5-25 MG per capsule, Take 1 capsule by mouth Daily., Disp: , Rfl:       No Known Allergies      /77 (BP Location: Right arm, Patient Position: Sitting, Cuff Size: Adult)   Pulse 87   Temp 98.6 °F (37 °C)   Resp 18   Ht 170.2 cm (67\")   Wt 78.5 kg (173 lb)   SpO2 93%   BMI 27.10 kg/m²       Physical Exam:  Constitutional: Patient is oriented to person, place, and time.  Appears well-developed and well-nourished.   Head: Normocephalic and atraumatic. Eyes: Conjunctivae and lids are normal. Pupils: Equal, round, and reactive to light.   Neck: Trachea normal. Neck supple. No JVD present.   Pulmonary: No increased work of breathing or signs of respiratory distress.  Clear to auscultation bilaterally.   Cardiovascular: Normal rate and rhythm, normal S1 and S2, no murmurs.   Abdomen: The abdomen was soft and non-tender to palpation.  Bowel sounds were normal.     Musculoskeletal   Gait and station: Normal  Lumbar spine: Passive and active range of motion are full and without pain. Extension, flexion, lateral flexion, rotation of the lumbar spine did not increase or " reproduce pain. Lumbar facet joint loading maneuvers are negative.   Giles and Gaenslen's tests are negative    Hip joints: The range of motion of the hip joints is full and without pain   Neurological: Patient is alert and oriented to person, place, and time. Speech: speech is normal. Cortical function: Normal mental status.   Cranial nerves: Cranial nerves 2-12 intact.   Reflex Scores:  patellar: 2+ bilaterally  Achilles: 2+ bilaterally  Motor strength: 5/5  Motor Tone: normal tone.   Involuntary movements: none.   Right ankle clonus: absent  Left ankle clonus: absent   Negative long tract signs. Straight leg raising test is negative.    Sensation: No sensory loss. Sensory exam: intact to light touch, intact pain and temperature sensation, intact vibration sensation and normal proprioception.   Coordination: Normal finger to nose and heel to shin. Normal balance and negative. Romberg's sign negative.   Skin and subcutaneous tissue: Skin is warm and intact. No rash noted. No cyanosis.   Psychiatric: Judgment and insight: Normal. Orientation to person, place and time: Normal. Recent and remote memory: Intact. Mood and affect: Normal.     ASSESSMENT:   1. Entrapment of right ilioinguinal nerve    2. History of abdominoplasty    3. Fibromyalgia    4. Mild persistent asthma without complication    5. Anxiety and depression      PLAN/MEDICAL DECISION MAKING: I have reviewed all available medical records as well as previous therapies as referenced above, and discussed the patient with Dr. Hyman.  1. Interventional pain management measures: None indicated.  Patient could be scheduled for repeat right ilioinguinal nerve block under ultrasound guidance by hydrodissection technique if pain recurs.  We may repeat blocks depending on outcome.  Otherwise, we may consider a spinal cord stimulator trial vs DRG trial with [place] [facility].  Patient has received an educational DVD on spinal cord stimulation therapies.  2.  Pharmacological measures: Reviewed and discussed.   A. Continue Rheumate one tablet twice daily (samples), then, continue once daily  B. Continue pyridoxine 25 mg one tablet by mouth three times daily  C. Continue with prilocaine 2%, lidocaine 10%, imipramine 3%, capsaicin 0.001% and mannitol 20%  cream, apply 1 to 2 grams of cream to the affected areas every 4 to 6 hours as needed  3. Long-term rehabilitation efforts:  A. Start a comprehensive physical therapy program for therapeutic exercise, neurodynamics, desensitization techniques, ultrasound, ASTYM, myofascial release, cupping and dry needling   B. Start an exercise program such as yoga, Pilates and water therapy  C. Use TENS unit/interferential unit  D. Contrast therapy: Apply ice-packs for 15-20 minutes, followed by heating pads for 15-20 minutes to affected area   4. The patient has been instructed to contact my office with any questions or difficulties. The patient understands the plan and agrees to proceed accordingly.         Patient Care Team:  Andrews Castellon MD as PCP - General (Family Medicine)  Liz Lindsay MD as Consulting Physician (Colon and Rectal Surgery)  Brianna West APRN as Nurse Practitioner (Nurse Practitioner)     No orders of the defined types were placed in this encounter.        Future Appointments   Date Time Provider Department Center   6/3/2019  9:30 AM Brianna West APRN [facility] None         Emre Polanco PA-C     EMR Dragon/Transcription disclaimer:  Much of this encounter note is an electronic transcription of spoken language to printed text. Electronic transcription of spoken language may permit erroneous, or at times, nonsensical words or phrases to be inadvertently transcribed. Although I have reviewed the note for such errors, some may still exist.

## 2019-03-26 ENCOUNTER — OFFICE VISIT (OUTPATIENT)
Dept: PAIN MEDICINE | Facility: CLINIC | Age: 54
End: 2019-03-26

## 2019-03-26 VITALS
OXYGEN SATURATION: 93 % | HEART RATE: 87 BPM | TEMPERATURE: 98.6 F | HEIGHT: 67 IN | WEIGHT: 173 LBS | SYSTOLIC BLOOD PRESSURE: 121 MMHG | BODY MASS INDEX: 27.15 KG/M2 | DIASTOLIC BLOOD PRESSURE: 77 MMHG | RESPIRATION RATE: 18 BRPM

## 2019-03-26 DIAGNOSIS — F32.A ANXIETY AND DEPRESSION: ICD-10-CM

## 2019-03-26 DIAGNOSIS — Z98.890 HISTORY OF ABDOMINOPLASTY: ICD-10-CM

## 2019-03-26 DIAGNOSIS — M79.7 FIBROMYALGIA: ICD-10-CM

## 2019-03-26 DIAGNOSIS — J45.30 MILD PERSISTENT ASTHMA WITHOUT COMPLICATION: ICD-10-CM

## 2019-03-26 DIAGNOSIS — G57.81 ENTRAPMENT OF RIGHT ILIOINGUINAL NERVE: Primary | ICD-10-CM

## 2019-03-26 DIAGNOSIS — F41.9 ANXIETY AND DEPRESSION: ICD-10-CM

## 2019-03-26 PROCEDURE — 99213 OFFICE O/P EST LOW 20 MIN: CPT | Performed by: PHYSICIAN ASSISTANT

## 2019-04-06 ENCOUNTER — TELEPHONE (OUTPATIENT)
Dept: URGENT CARE | Facility: CLINIC | Age: 54
End: 2019-04-06

## 2019-05-30 ENCOUNTER — TRANSCRIBE ORDERS (OUTPATIENT)
Dept: ADMINISTRATIVE | Facility: HOSPITAL | Age: 54
End: 2019-05-30

## 2019-05-30 DIAGNOSIS — Z12.31 VISIT FOR SCREENING MAMMOGRAM: Primary | ICD-10-CM

## 2019-06-03 ENCOUNTER — OFFICE VISIT (OUTPATIENT)
Dept: GYNECOLOGIC ONCOLOGY | Facility: CLINIC | Age: 54
End: 2019-06-03

## 2019-06-03 VITALS
SYSTOLIC BLOOD PRESSURE: 112 MMHG | BODY MASS INDEX: 28.19 KG/M2 | RESPIRATION RATE: 12 BRPM | WEIGHT: 180 LBS | HEART RATE: 85 BPM | DIASTOLIC BLOOD PRESSURE: 66 MMHG | OXYGEN SATURATION: 98 %

## 2019-06-03 DIAGNOSIS — Z01.419 WELL WOMAN EXAM WITH ROUTINE GYNECOLOGICAL EXAM: Primary | ICD-10-CM

## 2019-06-03 DIAGNOSIS — Z79.890 HORMONE REPLACEMENT THERAPY (HRT): ICD-10-CM

## 2019-06-03 PROCEDURE — 99396 PREV VISIT EST AGE 40-64: CPT | Performed by: NURSE PRACTITIONER

## 2019-06-03 RX ORDER — ESTERIFIED ESTROGEN AND METHYLTESTOSTERONE .625; 1.25 MG/1; MG/1
1 TABLET ORAL DAILY
Qty: 30 TABLET | Refills: 5 | Status: SHIPPED | OUTPATIENT
Start: 2019-06-03 | End: 2020-01-02 | Stop reason: SDUPTHER

## 2019-06-03 NOTE — PROGRESS NOTES
GYN ONCOLOGY ANNUAL WELL WOMAN VISIT      Domo Anderson  [id number]  1965      Chief Complaint: Annual Exam (no gyn complaints)        History of present illness:  Domo Anderson is a 54 y.o. year old female who is here today for an annual exam. She has a personal history of AUB and POP, s/p AD/BSO/ASC/[place] in  and laparoscopic CYN with mesh revision in . She is doing well on her current dose of HRT and requests refills today. She is scheduled to have a mammogram later this month and is UTD on her other well woman screenings. She reports she is feeling very well today and has no complaints. She denies vaginal bleeding, pelvic pain, changes in bowel or bladder function, new or concerning lesions, and breast problems. She has generalized pain r/t fibromyalgia, rates 4/10 today.   Patient is still working on finalizing her divorce. Her new relationship is going well and she has sold her home, closing Friday then leaving for "[employer] of Saturday.         Obstetric History:  OB History      Para Term  AB Living    4 3 3          SAB TAB Ectopic Molar Multiple Live Births                        Menstrual History:     No LMP recorded. Patient has had a hysterectomy.          Past Medical History:   Diagnosis Date   • Allergic rhinitis    • Anxiety    • Asthma    • Fibromyalgia    • Hx of uterine prolapse    • Hyperlipidemia    • IBS (irritable bowel syndrome)    • Osteoarthritis    • Vocal cord dysfunction        Past Surgical History:   Procedure Laterality Date   • ABDOMINAL SURGERY      hysterectomy   • AUGMENTATION MAMMAPLASTY Bilateral     SALINE   • HIP SURGERY  2017    left   • HYSTERECTOMY      AGE 38   • OOPHORECTOMY Bilateral     AGE 38   • SINUS SURGERY Right 2006    deviated septum   • VAGINAL MESH REVISION         MEDICATIONS: The current medication list was reviewed and reconciled.     Allergies:  has No Known Allergies.    Family History   Problem Relation Age of Onset   •  Ovarian cancer Neg Hx    • Breast cancer Neg Hx        Health Maintenance:  Last mammogram was 05/2018. Last colonoscopy was 2015, with recommended follow-up in 5 year(s). Last pap smear was 05/2018, results were  normal PAP.. She  does not have a history of abnormal pap smears.      Review of Systems   Constitutional: Negative for fatigue, fever and unexpected weight change.   HENT: Negative for congestion, ear pain, hearing loss, sinus pressure and trouble swallowing.    Eyes: Negative for visual disturbance.   Respiratory: Negative for cough, chest tightness, shortness of breath and wheezing.    Cardiovascular: Negative for chest pain, palpitations and leg swelling.   Gastrointestinal: Negative for abdominal distention, abdominal pain, constipation, diarrhea, nausea and vomiting.   Endocrine: Negative for cold intolerance, heat intolerance, polydipsia, polyphagia and polyuria.   Genitourinary: Negative for difficulty urinating, dyspareunia, dysuria, frequency, hematuria, pelvic pain, urgency, vaginal bleeding, vaginal discharge and vaginal pain.   Musculoskeletal: Negative for arthralgias, gait problem, joint swelling and myalgias.   Skin: Negative for color change, pallor and rash.   Neurological: Negative for dizziness, seizures, syncope, weakness, light-headedness, numbness and headaches.   Hematological: Negative for adenopathy. Does not bruise/bleed easily.   Psychiatric/Behavioral: Negative for agitation, confusion, sleep disturbance and suicidal ideas. The patient is not nervous/anxious.        Physical Exam  Vital Signs: /66   Pulse 85   Resp 12   Wt 81.6 kg (180 lb)   SpO2 98%   BMI 28.19 kg/m²   Pain Score    06/03/19 0943   PainSc:   4   PainLoc: Generalized      General Appearance:  alert, cooperative, no apparent distress and appears stated age   Neurologic/Psychiatric: A&O x 3, gait steady, appropriate affect   HEENT:  Normocephalic, without obvious abnormality, mucous membranes moist    Neck: Supple, symmetrical, trachea midline, no adenopathy;  No thyromegaly, masses, or tenderness   Back:   Symmetric, no curvature, ROM normal, no CVA tenderness   Lungs:   Clear to auscultation bilaterally; respirations regular, even, and unlabored bilaterally   Heart:  Regular rate and rhythm, no murmurs appreciated   Breasts:  Symmetrical, no masses, no lesions, no nipple discharge and implants noted bilaterally   Abdomen:   Soft, non-tender, non-distended and no organomegaly   Lymph nodes: No cervical, supraclavicular, inguinal or axillary adenopathy noted   Extremities: Normal, atraumatic; no clubbing, cyanosis, or edema    Skin: No rashes, ulcers, or suspicious lesions noted   Pelvic: External Genitalia  without lesions or skin changes  Vagina  is pink, moist, without lesions.   Vaginal Cuff  Female Vaginal Cuff: smooth, intact and without visible lesions  Uterus  surgically absent  Ovaries  surgically absent bilaterallly  Parametria  smooth  Rectovaginal  Female rectovaginal: deferred     ECOG Performance Status: 0 - Asymptomatic    Procedure Note:  No notes on file    Assessment and Plan:    Domo was seen today for annual exam.    Diagnoses and all orders for this visit:    Well woman exam with routine gynecological exam    Hormone replacement therapy (HRT)  -     estrogens, conjugated,-methyltestosterone (EST ESTROGENS-METHYLTEST HS) 0.625-1.25 MG per tablet; Take 1 tablet by mouth Daily.          No pap today.    She was encouraged to get yearly mammograms.  She should report any palpable breast lump(s) or skin changes regardless of mammographic findings.  I explained to Domo that notification regarding her mammogram results will come from the center performing the study.  Our office will not be routinely calling with mammogram results.  It is her responsibility to make sure that the results from the mammogram are communicated to her by the breast center.  If she has any questions about the results,  she is welcome to call our office anytime.    Colonoscopy due 2020.    DEXA is UTD.    Pain assessment was performed today as a part of patient’s care.  For patients with pain related to surgery, gynecologic malignancy or cancer treatment, the plan is as noted in the assessment/plan.  For patients with pain not related to these issues, they are to seek any further needed care from a more appropriate provider, such as PCP.      Return to clinic in 1 year for Annual exam.

## 2019-07-19 ENCOUNTER — HOSPITAL ENCOUNTER (OUTPATIENT)
Dept: MAMMOGRAPHY | Facility: HOSPITAL | Age: 54
Discharge: HOME | End: 2019-07-19
Admitting: PSYCHIATRY & NEUROLOGY

## 2019-07-19 DIAGNOSIS — Z12.31 VISIT FOR SCREENING MAMMOGRAM: ICD-10-CM

## 2019-07-19 PROCEDURE — 77063 BREAST TOMOSYNTHESIS BI: CPT

## 2019-07-19 PROCEDURE — 77067 SCR MAMMO BI INCL CAD: CPT | Performed by: RADIOLOGY

## 2019-07-19 PROCEDURE — 77067 SCR MAMMO BI INCL CAD: CPT

## 2019-07-19 PROCEDURE — 77063 BREAST TOMOSYNTHESIS BI: CPT | Performed by: RADIOLOGY

## 2020-01-02 DIAGNOSIS — Z79.890 HORMONE REPLACEMENT THERAPY (HRT): Primary | ICD-10-CM

## 2020-01-02 RX ORDER — ESTERIFIED ESTROGEN AND METHYLTESTOSTERONE .625; 1.25 MG/1; MG/1
1 TABLET ORAL DAILY
Qty: 30 TABLET | Refills: 5 | Status: SHIPPED | OUTPATIENT
Start: 2020-01-02 | End: 2020-02-17

## 2020-02-16 DIAGNOSIS — Z79.890 HORMONE REPLACEMENT THERAPY (HRT): ICD-10-CM

## 2020-06-15 DIAGNOSIS — Z79.890 HORMONE REPLACEMENT THERAPY (HRT): ICD-10-CM

## 2020-06-17 ENCOUNTER — TRANSCRIBE ORDERS (OUTPATIENT)
Dept: ADMINISTRATIVE | Facility: HOSPITAL | Age: 55
End: 2020-06-17

## 2020-06-17 DIAGNOSIS — Z12.31 VISIT FOR SCREENING MAMMOGRAM: Primary | ICD-10-CM

## 2020-08-25 ENCOUNTER — APPOINTMENT (OUTPATIENT)
Dept: MAMMOGRAPHY | Facility: HOSPITAL | Age: 55
End: 2020-08-25

## 2020-08-31 ENCOUNTER — HOSPITAL ENCOUNTER (OUTPATIENT)
Dept: MAMMOGRAPHY | Facility: HOSPITAL | Age: 55
Discharge: HOME | End: 2020-08-31
Admitting: FAMILY MEDICINE

## 2020-08-31 DIAGNOSIS — Z12.31 VISIT FOR SCREENING MAMMOGRAM: ICD-10-CM

## 2020-08-31 PROCEDURE — 77067 SCR MAMMO BI INCL CAD: CPT

## 2020-08-31 PROCEDURE — 77063 BREAST TOMOSYNTHESIS BI: CPT | Performed by: RADIOLOGY

## 2020-08-31 PROCEDURE — 77067 SCR MAMMO BI INCL CAD: CPT | Performed by: RADIOLOGY

## 2020-08-31 PROCEDURE — 77063 BREAST TOMOSYNTHESIS BI: CPT

## 2020-09-08 ENCOUNTER — TELEPHONE (OUTPATIENT)
Dept: URGENT CARE | Facility: CLINIC | Age: 55
End: 2020-09-08

## 2020-09-08 NOTE — TELEPHONE ENCOUNTER
----- Message from Mark Coelho MD sent at 9/8/2020  8:22 AM EDT -----  Your x-ray is within normal limits.  If there are continued symptoms follow-up will be recommended.  Mark Coelho MD    ----- Message -----  From: Interface, Rad Results [place] In  Sent: 9/7/2020   7:18 PM EDT  To: , #

## 2021-01-06 ENCOUNTER — TRANSCRIBE ORDERS (OUTPATIENT)
Dept: LAB | Facility: HOSPITAL | Age: 56
End: 2021-01-06

## 2021-01-06 ENCOUNTER — HOSPITAL ENCOUNTER (OUTPATIENT)
Dept: CARDIOLOGY | Facility: HOSPITAL | Age: 56
Discharge: HOME | End: 2021-01-06

## 2021-01-06 ENCOUNTER — TRANSCRIBE ORDERS (OUTPATIENT)
Dept: CARDIOLOGY | Facility: HOSPITAL | Age: 56
End: 2021-01-06

## 2021-01-06 ENCOUNTER — LAB (OUTPATIENT)
Dept: LAB | Facility: HOSPITAL | Age: 56
End: 2021-01-06

## 2021-01-06 DIAGNOSIS — Z01.812 PRE-OPERATIVE LABORATORY EXAMINATION: ICD-10-CM

## 2021-01-06 DIAGNOSIS — Z01.812 PRE-OPERATIVE LABORATORY EXAMINATION: Primary | ICD-10-CM

## 2021-01-06 LAB
DEPRECATED RDW RBC AUTO: 39.8 FL (ref 37–54)
ERYTHROCYTE [DISTWIDTH] IN BLOOD BY AUTOMATED COUNT: 11.9 % (ref 12.3–15.4)
HCT VFR BLD AUTO: 42.7 % (ref 34–46.6)
HGB BLD-MCNC: 14.2 G/DL (ref 12–15.9)
MCH RBC QN AUTO: 30.4 PG (ref 26.6–33)
MCHC RBC AUTO-ENTMCNC: 33.3 G/DL (ref 31.5–35.7)
MCV RBC AUTO: 91.4 FL (ref 79–97)
PLATELET # BLD AUTO: 227 10*3/MM3 (ref 140–450)
PMV BLD AUTO: 9.1 FL (ref 6–12)
RBC # BLD AUTO: 4.67 10*6/MM3 (ref 3.77–5.28)
WBC # BLD AUTO: 4.48 10*3/MM3 (ref 3.4–10.8)

## 2021-01-06 PROCEDURE — 36415 COLL VENOUS BLD VENIPUNCTURE: CPT

## 2021-01-06 PROCEDURE — 93005 ELECTROCARDIOGRAM TRACING: CPT | Performed by: OTOLARYNGOLOGY

## 2021-01-06 PROCEDURE — 93010 ELECTROCARDIOGRAM REPORT: CPT | Performed by: INTERNAL MEDICINE

## 2021-01-06 PROCEDURE — 80053 COMPREHEN METABOLIC PANEL: CPT

## 2021-01-06 PROCEDURE — 85027 COMPLETE CBC AUTOMATED: CPT

## 2021-01-07 LAB
ALBUMIN SERPL-MCNC: 4.2 G/DL (ref 3.5–5.2)
ALBUMIN/GLOB SERPL: 1.5 G/DL
ALP SERPL-CCNC: 74 U/L (ref 39–117)
ALT SERPL W P-5'-P-CCNC: 26 U/L (ref 1–33)
ANION GAP SERPL CALCULATED.3IONS-SCNC: 14.6 MMOL/L (ref 5–15)
AST SERPL-CCNC: 24 U/L (ref 1–32)
BILIRUB SERPL-MCNC: 0.4 MG/DL (ref 0–1.2)
BUN SERPL-MCNC: 20 MG/DL (ref 6–20)
BUN/CREAT SERPL: 22.5 (ref 7–25)
CALCIUM SPEC-SCNC: 9.2 MG/DL (ref 8.6–10.5)
CHLORIDE SERPL-SCNC: 105 MMOL/L (ref 98–107)
CO2 SERPL-SCNC: 19.4 MMOL/L (ref 22–29)
CREAT SERPL-MCNC: 0.89 MG/DL (ref 0.57–1)
GFR SERPL CREATININE-BSD FRML MDRD: 65 ML/MIN/1.73
GLOBULIN UR ELPH-MCNC: 2.8 GM/DL
GLUCOSE SERPL-MCNC: 82 MG/DL (ref 65–99)
POTASSIUM SERPL-SCNC: 4.7 MMOL/L (ref 3.5–5.2)
PROT SERPL-MCNC: 7 G/DL (ref 6–8.5)
QT INTERVAL: 374 MS
QTC INTERVAL: 412 MS
SODIUM SERPL-SCNC: 139 MMOL/L (ref 136–145)

## 2021-01-12 ENCOUNTER — APPOINTMENT (OUTPATIENT)
Dept: PREADMISSION TESTING | Facility: HOSPITAL | Age: 56
End: 2021-01-12

## 2021-01-12 PROCEDURE — C9803 HOPD COVID-19 SPEC COLLECT: HCPCS

## 2021-01-12 PROCEDURE — U0004 COV-19 TEST NON-CDC HGH THRU: HCPCS

## 2021-01-13 LAB — SARS-COV-2 RNA RESP QL NAA+PROBE: NOT DETECTED

## 2021-01-14 DIAGNOSIS — Z79.890 HORMONE REPLACEMENT THERAPY (HRT): ICD-10-CM

## 2021-05-27 ENCOUNTER — TELEPHONE (OUTPATIENT)
Dept: GYNECOLOGIC ONCOLOGY | Facility: CLINIC | Age: 56
End: 2021-05-27

## 2021-05-27 DIAGNOSIS — Z79.890 HORMONE REPLACEMENT THERAPY (HRT): ICD-10-CM

## 2021-05-27 NOTE — TELEPHONE ENCOUNTER
I will send refills x 3 months only to give her time to find new GYN. Patient is benign and will need to establish with regular GYN for ongoing care. Thanks!

## 2021-05-27 NOTE — TELEPHONE ENCOUNTER
Phoned patient. Left her a message stating that she needed to return my call so I can explain that we no longer see routine GYN and to give her contact info.

## 2021-05-27 NOTE — TELEPHONE ENCOUNTER
----- Message from DANYEL Bush sent at 5/27/2021 10:21 AM EDT -----  Regarding: benign patient  Brandy Vidal,    This patient is benign and did not get a letter as she was not on the schedule this year. Has not been seen since 6/2019. She sent a refills request for her HRT. I am sending her 3 months worth to give her time to establish with regular GYN going forward.     Thanks!

## 2021-08-21 PROBLEM — Z98.890 HISTORY OF ABDOMINOPLASTY: Status: RESOLVED | Noted: 2019-02-07 | Resolved: 2021-08-21

## 2021-08-21 PROBLEM — Z01.419 WELL WOMAN EXAM: Status: ACTIVE | Noted: 2021-08-21

## 2021-08-27 ENCOUNTER — OFFICE VISIT (OUTPATIENT)
Dept: OBSTETRICS AND GYNECOLOGY | Facility: CLINIC | Age: 56
End: 2021-08-27

## 2021-08-27 VITALS
SYSTOLIC BLOOD PRESSURE: 122 MMHG | WEIGHT: 196 LBS | BODY MASS INDEX: 29.8 KG/M2 | DIASTOLIC BLOOD PRESSURE: 78 MMHG | RESPIRATION RATE: 14 BRPM

## 2021-08-27 DIAGNOSIS — Z01.419 WELL WOMAN EXAM: Primary | ICD-10-CM

## 2021-08-27 DIAGNOSIS — Z79.890 HORMONE REPLACEMENT THERAPY (HRT): ICD-10-CM

## 2021-08-27 DIAGNOSIS — N81.6 RECTOCELE: ICD-10-CM

## 2021-08-27 PROBLEM — G57.81 ENTRAPMENT OF RIGHT ILIOINGUINAL NERVE: Status: RESOLVED | Noted: 2019-02-07 | Resolved: 2021-08-27

## 2021-08-27 PROBLEM — E78.5 HYPERLIPIDEMIA: Status: ACTIVE | Noted: 2020-01-01

## 2021-08-27 PROCEDURE — 99386 PREV VISIT NEW AGE 40-64: CPT | Performed by: OBSTETRICS & GYNECOLOGY

## 2021-08-27 NOTE — PROGRESS NOTES
Subjective   Chief Complaint   Patient presents with   • Gynecologic Exam     Domo Anderson is a 56 y.o. year old  who is post-menopausal.  She is S/P hysterectomy presenting to be seen for her annual exam.  She is a former patient of mine.  She comes to get reestablished.  She also has problems with what she perceives to be a recurrent rectocele.  She has to splint on posterior vaginal wall in order to help move her bowels.  She has a longstanding history of chronic constipation but has not been a problem recently.  She does have Linzess but does not take it often.  She has a bowel movement daily.    She has had a prior hysterectomy with ovary removal.  She had mesh placed at the time.  This was done by Dr. Lr.  Subsequently she has had mesh removal about 5 years later.    This past year she has been on hormone replacement therapy.  She has been on a testosterone-containing form of estrogen hormone replacement therapy.  This was initiated because of low libido.  It turns out that years ago when this was initiated she was in a bad relationship with her former  who was cheating and had multiple affairs including men.  There has not been vaginal bleeding in the last 12 months.  Menopausal symptoms are not present.    SEXUAL Hx:  She is currently sexually active.  In the past year there there has been NO new sexual partners.    Condoms are never used.  She would not like to be screened for STD's at today's exam.  Chauvin is painful: no  HEALTH Hx:  She exercises regularly: no (and has no plans to become more active).  She wears her seat belt: yes.  She has concerns about domestic violence: no.  OTHER THINGS SHE WANTS TO DISCUSS TODAY:  Nothing else    The following portions of the patient's history were reviewed and updated as appropriate:problem list, current medications, allergies, past family history, past medical history, past social history and past surgical history.    Social  History    Tobacco Use      Smoking status: Never Smoker      Smokeless tobacco: Never Used    Review of Systems  Constitutional POS: nothing reported    NEG: anorexia or night sweats   Genitourinary POS: nothing reported    NEG: dysuria or hematuria   Gastointestinal POS: nothing reported and had colonoscopy in the past 10 year - results are not in record for review    NEG: bloating, change in bowel habits, melena or reflux symptoms   Integument POS: nothing reported    NEG: moles that are changing in size, shape, color or rashes   Breast POS: nothing reported    NEG: persistent breast lump, skin dimpling or nipple discharge        Objective   /78   Resp 14   Wt 88.9 kg (196 lb)   LMP  (LMP Unknown)   Breastfeeding No   BMI 29.80 kg/m²     General:  well developed; well nourished  no acute distress   Skin:  No suspicious lesions seen   Thyroid: normal to inspection and palpation   Breasts:  Examined in supine position  Symmetric without masses or skin dimpling  Nipples normal without inversion, lesions or discharge  There are no palpable axillary nodes  Bilateral implants are noted without obvious palpable abnormalities   Abdomen: soft, non-tender; no masses  no umbilical or inguinal hernias are present  no hepato-splenomegaly   Pelvis: Clinical staff was present for exam  External genitalia:  normal appearance of the external genitalia including Bartholin's and [place]'s glands.  :  urethral meatus normal;  Vaginal:  normal pink mucosa without prolapse or lesions. Mesh is felt at the vaginal apex  Cervix:  absent.  Uterus:  absent.  Adnexa:  absent, bilateral.  Rectal:  digital rectal exam not performed; anus visually normal appearing.  Cystocele GRADE 1  Rectocele GRADE 3        Assessment   1. Symptomatic rectocele with gaping introitus impacting day-to-day function  2. Menopausal female currently on HRT - without significant symptoms affecting activities of daily living.  I think there is no reason  for us to continue the testosterone containing hormone replacement therapy.  I expect her low libido had more to do with psychosocial issues and she would probably do well on standard estrogen replacement therapy     Plan   1. Will need site-specific posterior repair with perineoplasty after Covid restrictions have been lifted  2. Impact of weight on long-term success rates reviewed  3. Small risk of dyspareunia along with vaginal shortening reviewed  4. The following data needs to be obtained to update her medical records: last DEXA and last colonoscopy.  5. The importance of keeping all planned follow-up and taking all medications as prescribed was emphasized.  6. Follow up for recheck of Sx in 4-6 months     New Medications Ordered This Visit   Medications   • estrogens, conjugated, (Premarin) 0.625 MG tablet     Sig: Take 1 tablet by mouth Daily.     Dispense:  30 tablet     Refill:  5          This note was electronically signed.    Eleuterio Ward M.D.  August 27, 2021    Note: Speech recognition transcription software may have been used to create portions of this document.  An attempt at proofreading has been made but errors in transcription could still be present.

## 2021-09-24 ENCOUNTER — TRANSCRIBE ORDERS (OUTPATIENT)
Dept: ADMINISTRATIVE | Facility: HOSPITAL | Age: 56
End: 2021-09-24

## 2021-09-24 DIAGNOSIS — Z12.31 VISIT FOR SCREENING MAMMOGRAM: Primary | ICD-10-CM

## 2021-10-11 ENCOUNTER — HOSPITAL ENCOUNTER (OUTPATIENT)
Dept: GENERAL RADIOLOGY | Facility: HOSPITAL | Age: 56
Discharge: HOME | End: 2021-10-11
Admitting: NURSE PRACTITIONER

## 2021-10-11 ENCOUNTER — TRANSCRIBE ORDERS (OUTPATIENT)
Dept: ADMINISTRATIVE | Facility: HOSPITAL | Age: 56
End: 2021-10-11

## 2021-10-11 DIAGNOSIS — R05.3 PERSISTENT COUGH FOR 3 WEEKS OR LONGER: Primary | ICD-10-CM

## 2021-10-11 PROCEDURE — 71046 X-RAY EXAM CHEST 2 VIEWS: CPT

## 2021-10-14 ENCOUNTER — TRANSCRIBE ORDERS (OUTPATIENT)
Dept: ADMINISTRATIVE | Facility: HOSPITAL | Age: 56
End: 2021-10-14

## 2021-10-14 DIAGNOSIS — R79.89 ELEVATED D-DIMER: Primary | ICD-10-CM

## 2021-10-19 ENCOUNTER — HOSPITAL ENCOUNTER (OUTPATIENT)
Dept: CT IMAGING | Facility: HOSPITAL | Age: 56
Discharge: HOME | End: 2021-10-19
Admitting: NURSE PRACTITIONER

## 2021-10-19 DIAGNOSIS — R79.89 ELEVATED D-DIMER: ICD-10-CM

## 2021-10-19 PROCEDURE — 0 IOPAMIDOL PER 1 ML: Performed by: NURSE PRACTITIONER

## 2021-10-19 PROCEDURE — 71275 CT ANGIOGRAPHY CHEST: CPT

## 2021-10-19 RX ADMIN — IOPAMIDOL 100 ML: 755 INJECTION, SOLUTION INTRAVENOUS at 14:27

## 2021-11-05 ENCOUNTER — APPOINTMENT (OUTPATIENT)
Dept: GENERAL RADIOLOGY | Facility: HOSPITAL | Age: 56
End: 2021-11-05

## 2021-11-05 ENCOUNTER — HOSPITAL ENCOUNTER (EMERGENCY)
Facility: HOSPITAL | Age: 56
Discharge: HOME | End: 2021-11-05
Attending: EMERGENCY MEDICINE | Admitting: EMERGENCY MEDICINE

## 2021-11-05 VITALS
DIASTOLIC BLOOD PRESSURE: 81 MMHG | BODY MASS INDEX: 29.55 KG/M2 | TEMPERATURE: 97.5 F | OXYGEN SATURATION: 98 % | SYSTOLIC BLOOD PRESSURE: 110 MMHG | HEART RATE: 86 BPM | WEIGHT: 195 LBS | HEIGHT: 68 IN | RESPIRATION RATE: 16 BRPM

## 2021-11-05 DIAGNOSIS — R07.89 ATYPICAL CHEST PAIN: Primary | ICD-10-CM

## 2021-11-05 LAB
ALBUMIN SERPL-MCNC: 4.6 G/DL (ref 3.5–5.2)
ALBUMIN/GLOB SERPL: 1.9 G/DL
ALP SERPL-CCNC: 104 U/L (ref 39–117)
ALT SERPL W P-5'-P-CCNC: 22 U/L (ref 1–33)
ANION GAP SERPL CALCULATED.3IONS-SCNC: 13 MMOL/L (ref 5–15)
AST SERPL-CCNC: 16 U/L (ref 1–32)
BASOPHILS # BLD AUTO: 0.04 10*3/MM3 (ref 0–0.2)
BASOPHILS NFR BLD AUTO: 0.8 % (ref 0–1.5)
BILIRUB SERPL-MCNC: 0.5 MG/DL (ref 0–1.2)
BUN SERPL-MCNC: 20 MG/DL (ref 6–20)
BUN/CREAT SERPL: 23.8 (ref 7–25)
CALCIUM SPEC-SCNC: 9.4 MG/DL (ref 8.6–10.5)
CHLORIDE SERPL-SCNC: 97 MMOL/L (ref 98–107)
CO2 SERPL-SCNC: 26 MMOL/L (ref 22–29)
CREAT SERPL-MCNC: 0.84 MG/DL (ref 0.57–1)
DEPRECATED RDW RBC AUTO: 39.9 FL (ref 37–54)
EOSINOPHIL # BLD AUTO: 0.09 10*3/MM3 (ref 0–0.4)
EOSINOPHIL NFR BLD AUTO: 1.8 % (ref 0.3–6.2)
ERYTHROCYTE [DISTWIDTH] IN BLOOD BY AUTOMATED COUNT: 11.9 % (ref 12.3–15.4)
GFR SERPL CREATININE-BSD FRML MDRD: 70 ML/MIN/1.73
GLOBULIN UR ELPH-MCNC: 2.4 GM/DL
GLUCOSE SERPL-MCNC: 97 MG/DL (ref 65–99)
HCT VFR BLD AUTO: 42.8 % (ref 34–46.6)
HGB BLD-MCNC: 14.2 G/DL (ref 12–15.9)
HOLD SPECIMEN: NORMAL
IMM GRANULOCYTES # BLD AUTO: 0.02 10*3/MM3 (ref 0–0.05)
IMM GRANULOCYTES NFR BLD AUTO: 0.4 % (ref 0–0.5)
LIPASE SERPL-CCNC: 36 U/L (ref 13–60)
LYMPHOCYTES # BLD AUTO: 1.45 10*3/MM3 (ref 0.7–3.1)
LYMPHOCYTES NFR BLD AUTO: 28.7 % (ref 19.6–45.3)
MCH RBC QN AUTO: 30.5 PG (ref 26.6–33)
MCHC RBC AUTO-ENTMCNC: 33.2 G/DL (ref 31.5–35.7)
MCV RBC AUTO: 92 FL (ref 79–97)
MONOCYTES # BLD AUTO: 0.43 10*3/MM3 (ref 0.1–0.9)
MONOCYTES NFR BLD AUTO: 8.5 % (ref 5–12)
NEUTROPHILS NFR BLD AUTO: 3.03 10*3/MM3 (ref 1.7–7)
NEUTROPHILS NFR BLD AUTO: 59.8 % (ref 42.7–76)
NRBC BLD AUTO-RTO: 0 /100 WBC (ref 0–0.2)
NT-PROBNP SERPL-MCNC: <5 PG/ML (ref 0–900)
PLATELET # BLD AUTO: 243 10*3/MM3 (ref 140–450)
PMV BLD AUTO: 9 FL (ref 6–12)
POTASSIUM SERPL-SCNC: 4.1 MMOL/L (ref 3.5–5.2)
PROT SERPL-MCNC: 7 G/DL (ref 6–8.5)
QT INTERVAL: 402 MS
QTC INTERVAL: 442 MS
RBC # BLD AUTO: 4.65 10*6/MM3 (ref 3.77–5.28)
SODIUM SERPL-SCNC: 136 MMOL/L (ref 136–145)
TROPONIN T SERPL-MCNC: <0.01 NG/ML (ref 0–0.03)
TROPONIN T SERPL-MCNC: <0.01 NG/ML (ref 0–0.03)
WBC # BLD AUTO: 5.06 10*3/MM3 (ref 3.4–10.8)
WHOLE BLOOD HOLD SPECIMEN: NORMAL
WHOLE BLOOD HOLD SPECIMEN: NORMAL

## 2021-11-05 PROCEDURE — 85025 COMPLETE CBC W/AUTO DIFF WBC: CPT | Performed by: EMERGENCY MEDICINE

## 2021-11-05 PROCEDURE — 99284 EMERGENCY DEPT VISIT MOD MDM: CPT

## 2021-11-05 PROCEDURE — 84484 ASSAY OF TROPONIN QUANT: CPT | Performed by: EMERGENCY MEDICINE

## 2021-11-05 PROCEDURE — 71045 X-RAY EXAM CHEST 1 VIEW: CPT

## 2021-11-05 PROCEDURE — 83880 ASSAY OF NATRIURETIC PEPTIDE: CPT | Performed by: EMERGENCY MEDICINE

## 2021-11-05 PROCEDURE — 36415 COLL VENOUS BLD VENIPUNCTURE: CPT

## 2021-11-05 PROCEDURE — 80053 COMPREHEN METABOLIC PANEL: CPT | Performed by: EMERGENCY MEDICINE

## 2021-11-05 PROCEDURE — 93005 ELECTROCARDIOGRAM TRACING: CPT

## 2021-11-05 PROCEDURE — 83690 ASSAY OF LIPASE: CPT | Performed by: EMERGENCY MEDICINE

## 2021-11-05 PROCEDURE — 93005 ELECTROCARDIOGRAM TRACING: CPT | Performed by: EMERGENCY MEDICINE

## 2021-11-05 RX ORDER — ASPIRIN 81 MG/1
324 TABLET, CHEWABLE ORAL ONCE
Status: DISCONTINUED | OUTPATIENT
Start: 2021-11-05 | End: 2021-11-05

## 2021-11-05 RX ORDER — ASPIRIN 81 MG/1
324 TABLET, CHEWABLE ORAL ONCE
Status: COMPLETED | OUTPATIENT
Start: 2021-11-05 | End: 2021-11-05

## 2021-11-05 RX ORDER — SODIUM CHLORIDE 0.9 % (FLUSH) 0.9 %
10 SYRINGE (ML) INJECTION AS NEEDED
Status: DISCONTINUED | OUTPATIENT
Start: 2021-11-05 | End: 2021-11-05 | Stop reason: HOSPADM

## 2021-11-05 RX ADMIN — NITROGLYCERIN 1 INCH: 20 OINTMENT TOPICAL at 13:39

## 2021-11-05 RX ADMIN — ASPIRIN 324 MG: 81 TABLET, CHEWABLE ORAL at 13:37

## 2021-11-05 NOTE — DISCHARGE INSTRUCTIONS
Take baby aspirin daily as we discussed.    Follow-up with your PCP in the cardiology clinic as we discussed. I have referred you to Dr. Kat. Call them in 24 hours if you do not hear from them about the scheduling of your appointment.    Return to the emergency department immediately for new or changing concerns.    Please review the medications you are supposed to be taking according to prior physician recommendations. I have not changed your home medications during this visit. If your discharge instructions indicate that I have changed your home medications, this is not the case, and you should disregard. If you have any questions about the medication you should be taking at home, please call your physician.

## 2021-11-05 NOTE — ED PROVIDER NOTES
Subjective   This patient is a 56-year-old female who comes in with several hours of chest pain under the left breast and radiating to the left shoulder. She has never had pain like this before. Denies any history of gallbladder issues or pancreatitis. Denies any hemoptysis or hematemesis. She tells me the pain is a tightness and periodically sharp sensation. She denies any shortness of breath or jaw radiation. Denies any diaphoresis, nausea vomiting or diarrhea. Denies any fevers chills or cough. Reports that she has never had a catheterization or a stress test. Denies any history of cardiac issues but tells me that her mom, aunt, and grandmother all have heart issues/CAD. Patient does have a history of dyslipidemia. Denies any history of hypertension. She also has a history of fibromyalgia, vocal cord dysfunction, anxiety and depression. She tells me she is still having the pain but it is slightly better than it was. She denies taking aspirin and has not used nitroglycerin. Denies any syncope or presyncope.    Past medical history  Anxiety, depression, constipation, fibromyalgia, asthma, dyslipidemia. Negative for hypertension, CAD or CVA.    Family history  CAD in mom, aunt, and grandmother          Review of Systems   Constitutional: Negative.  Negative for chills, fatigue, fever and unexpected weight change.   HENT: Negative for dental problem, ear pain, hearing loss and sinus pressure.    Eyes: Negative for pain and visual disturbance.   Respiratory: Positive for chest tightness. Negative for shortness of breath.    Cardiovascular: Positive for chest pain. Negative for palpitations and leg swelling.   Gastrointestinal: Negative for blood in stool, diarrhea, nausea and vomiting.   Genitourinary: Negative for difficulty urinating, dysuria, frequency, hematuria and urgency.   Musculoskeletal: Negative for myalgias, neck pain and neck stiffness.   Neurological: Negative for seizures, syncope, speech difficulty,  light-headedness and headaches.   Psychiatric/Behavioral: Negative for confusion.   All other systems reviewed and are negative.      Past Medical History:   Diagnosis Date   • Anxiety and depression 2012   • Asthma 1995   • Chronic constipation 2013   • Fibromyalgia 1995   • Hyperlipidemia 2020   • Osteoarthritis    • Vocal cord dysfunction 1997       No Known Allergies    Past Surgical History:   Procedure Laterality Date   • ABDOMINOPLASTY  2014   • AUGMENTATION MAMMAPLASTY Bilateral 2014   • HIP SURGERY Left 2017    Repair of tendons   • SINUS SURGERY Right 2006    deviated septum   • TOTAL ABDOMINAL HYSTERECTOMY SALPINGO OOPHORECTOMY WITH ABDOMINAL SACROCOLPOPEXY Bilateral 2003    Along with mesh placement.  Dr. Lr done for abnormal bleeding   • VAGINAL MESH REVISION  2008    Dr. Lr       Family History   Problem Relation Age of Onset   • Ovarian cancer Neg Hx    • Breast cancer Neg Hx        Social History     Socioeconomic History   • Marital status:    Tobacco Use   • Smoking status: Never Smoker   • Smokeless tobacco: Never Used   Substance and Sexual Activity   • Alcohol use: Yes     Comment: Socially   • Drug use: No   • Sexual activity: Defer           Objective   Physical Exam  Vitals and nursing note reviewed.   Constitutional:       General: She is not in acute distress.     Appearance: She is well-developed. She is not toxic-appearing.   HENT:      Head: Normocephalic and atraumatic.      Jaw: No trismus.      Right Ear: External ear normal.      Left Ear: External ear normal.      Nose: Nose normal.      Mouth/Throat:      Mouth: Mucous membranes are moist. Mucous membranes are not dry. No oral lesions.      Dentition: No dental abscesses.      Pharynx: Oropharynx is clear. No posterior oropharyngeal erythema or uvula swelling.      Tonsils: No tonsillar exudate or tonsillar abscesses.   Eyes:      General:         Right eye: No discharge.         Left eye: Discharge present.      Extraocular Movements: Extraocular movements intact.      Conjunctiva/sclera: Conjunctivae normal.      Right eye: Right conjunctiva is not injected.      Left eye: Left conjunctiva is not injected.      Pupils: Pupils are equal, round, and reactive to light.   Neck:      Vascular: No JVD.      Trachea: No tracheal tenderness.   Cardiovascular:      Rate and Rhythm: Normal rate and regular rhythm.      Heart sounds: Normal heart sounds. No friction rub. No gallop.    Pulmonary:      Effort: Pulmonary effort is normal.      Breath sounds: Normal breath sounds. No wheezing or rales.   Chest:      Chest wall: No tenderness.   Abdominal:      General: Bowel sounds are normal. There is no distension.      Palpations: Abdomen is soft. Abdomen is not rigid. There is no mass or pulsatile mass.      Tenderness: There is no abdominal tenderness. There is no guarding or rebound. Negative signs include McBurney's sign.      Comments: No signs of acute abdomen.  No pain at McBurney's point.  No pulsatile abdominal mass.   Musculoskeletal:         General: No tenderness or deformity. Normal range of motion.      Cervical back: Normal range of motion and neck supple. No rigidity. Normal range of motion.   Lymphadenopathy:      Cervical: No cervical adenopathy.   Skin:     General: Skin is warm and dry.      Capillary Refill: Capillary refill takes less than 2 seconds.      Findings: No erythema or rash.      Comments: No diaphoresis, lesions, nevi, petechia, purpura   Neurological:      Mental Status: She is alert and oriented to person, place, and time.      Cranial Nerves: No cranial nerve deficit.      Sensory: No sensory deficit.      Motor: No tremor or abnormal muscle tone.      Comments: 5/5 strength bilaterally with flexion and extension of fingers, wrist, elbows, knees and hips as well as plantar and dorsiflexion of the foot.   Psychiatric:         Attention and Perception: She is attentive.         Speech: Speech  normal.         Behavior: Behavior normal.         Thought Content: Thought content normal.         Judgment: Judgment normal.         Procedures           ED Course  ED Course as of 11/09/21 0645   Fri Nov 05, 2021   1242 I had a good conversation with the patient. We talked about the differential diagnosis and medical decision making. More specifically, we talked about aortic dissection, pulmonary embolus, acute coronary syndrome, cardiac tamponade, pneumothorax, pneumonia, and other etiologies. We will start about angina. The patient is feeling a little bit of chest pressure on the left side and I have ordered Nitropaste and aspirin. EKG has been reviewed independently by me. There is a normal sinus rhythm with no evidence of acute ST segment elevation. Age-indeterminate anterior changes noted. Rate of 82. Chest x-ray and other labs pending. I plan to discuss the case with cardiology following completion of work-up here. Cardiologist today is Dr. Kat. [RAH]   1243 Patient verbalized full understanding of the plan. All questions were answered. Final impression plan following completion of work-up. [RAH]   1343 BMP is unremarkable at less than five.  Troponin normal.  CBC unremarkable.  Chest x-ray unremarkable.  EKG already noted.  Lipase and CMP pending.  Patient resting comfortably at this time.  Nitroglycerin and aspirin have been provided.  Will discuss with cardiology following completion of work-up and results being available. [RAH]   1354 For set of labs completely unremarkable.  This includes BMP, CBC, lipase, CMP and troponin.  Second set of enzymes pending.  If unremarkable, as anticipated, will likely have the patient follow-up as an outpatient with Dr. Kat's office.  I will discussed the case with Dr. Shey Benitez's PA or with Dr. Kat personally before discharge and have discussed this plan with the patient already. [RAH]   1357 Second EKG shows a normal sinus rhythm with a rate of 73.   Age-indeterminate anterior changes.  No evidence of acute/dynamic ST segment changes. [RAH]   1439 Second troponin has been collected.  If negative, as anticipated, patient will be discharged home.  Nitropaste has been applied and patient is feeling better.  CBC CMP troponin and lipase all unremarkable.  BMP likewise unremarkable.  Chest x-ray shows no evidence of acute disease process. [RAH]   1439 I discussed the case with Dr. Jose Carlos Kat at approximately 2:15 PM Eastern time.  He was going to have his team call the patient to make an outpatient follow-up appointment in his office for stress testing.  I relayed this to the patient at approximately 230 and she was uncomfortable and quite excited about the plan as she did not want to come into the hospital either way.  Impression will include atypical chest pain.  We will have her follow-up with Dr. Kat.  Final impression and plan following completion of work-up. [RAH]   1518 We are still awaiting the lab result a second opponent.  Patient has been very understanding.  Blood work was drawn more than an hour ago unfortunately. [RAH]   1608 Second troponin  resulted just now at approximate 4 PM Eastern time.  It is unremarkable and patient will be discharged home accordingly.  Patient will follow up with Dr. Kat as we previously discussed.  His office will call for an appointment.  Patient was agreeable to the plan without question or complaint.  Very appreciative for care and will be discharged home accordingly. [RAH]      ED Course User Index  [RAH] Kurt Rodriguez MD      No results found for this or any previous visit (from the past 24 hour(s)).  Note: In addition to lab results from this visit, the labs listed above may include labs taken at another facility or during a different encounter within the last 24 hours. Please correlate lab times with ED admission and discharge times for further clarification of the services performed during this visit.    XR  Chest 1 View   Final Result   No evidence of acute disease in the chest.        This report was finalized on 11/5/2021 12:36 PM by Tanmay Singh.            Vitals:    11/05/21 1343 11/05/21 1343 11/05/21 1509 11/05/21 1606   BP:  112/78 108/75 110/81   BP Location:  Right arm Right arm Right arm   Patient Position:  Lying Lying Lying   Pulse:  71 82 86   Resp:  16 16 16   Temp:       TempSrc:       SpO2: 95% 96% 98% 98%   Weight:       Height:         Medications   aspirin chewable tablet 324 mg (324 mg Oral Given 11/5/21 1337)   nitroglycerin (NITROSTAT) ointment 1 inch (1 inch Topical Given 11/5/21 1339)     ECG/EMG Results (last 24 hours)     Procedure Component Value Units Date/Time    ECG 12 Lead [[id number]] Collected: 11/05/21 1354     Updated: 11/05/21 1624     QT Interval 402 ms      QTC Interval 442 ms     Narrative:      Test Reason : chest pain  Blood Pressure :   */*   mmHG  Vent. Rate :  73 BPM     Atrial Rate :  73 BPM     P-R Int : 166 ms          QRS Dur :  82 ms      QT Int : 402 ms       P-R-T Axes :  42  24   5 degrees     QTc Int : 442 ms    Normal sinus rhythm  Cannot rule out Anterior infarct (cited on or before 05-NOV-2021)  Abnormal ECG  When compared with ECG of 05-NOV-2021 12:08, (Unconfirmed)  Nonspecific T wave abnormality, improved in Anterolateral leads  Confirmed by LETICIA KAMARA MD ([id number]) on 11/5/2021 4:24:22 PM    Referred By: EDMD           Confirmed By: LETICIA KAMARA MD        ECG 12 Lead   Final Result   Test Reason : chest pain   Blood Pressure :   */*   mmHG   Vent. Rate :  73 BPM     Atrial Rate :  73 BPM      P-R Int : 166 ms          QRS Dur :  82 ms       QT Int : 402 ms       P-R-T Axes :  42  24   5 degrees      QTc Int : 442 ms      Normal sinus rhythm   Cannot rule out Anterior infarct (cited on or before 05-NOV-2021)   Abnormal ECG   When compared with ECG of 05-NOV-2021 12:08, (Unconfirmed)   Nonspecific T wave abnormality, improved in Anterolateral leads    Confirmed by KURT RODRIGUEZ MD ([id number]) on 11/5/2021 4:24:22 PM      Referred By: EDMD           Confirmed By: KURT RODRIGUEZ MD      ECG 12 Lead   Final Result   Test Reason : chest pain   Blood Pressure :   */*   mmHG   Vent. Rate :  82 BPM     Atrial Rate :  82 BPM      P-R Int : 170 ms          QRS Dur :  76 ms       QT Int : 354 ms       P-R-T Axes :  52  40  23 degrees      QTc Int : 413 ms      Normal sinus rhythm   Cannot rule out Anterior infarct , age undetermined   Abnormal ECG   When compared with ECG of 06-JAN-2021 12:50,   Nonspecific T wave abnormality now evident in Lateral leads   Confirmed by KURT RODRIGUEZ MD ([id number]) on 11/9/2021 6:16:14 AM      Referred By:            Confirmed By: KURT RODRIGUEZ MD                                               [facility]    Final diagnoses:   Atypical chest pain       ED Disposition  ED Disposition     ED Disposition Condition Comment    Discharge Stable           Andrews Castellon MD  [address]  [facility] [id number] [phone]    In 1 week      Tanmay Kat MD  [address]  [facility] [address]  [phone]    In 3 days  His office will call for an appointment on Monday.  If you do not hear from them, feel free to call the number provided here.         Medication List      No changes were made to your prescriptions during this visit.          Kurt Rodriguez MD  11/09/21 0645

## 2021-11-09 LAB
QT INTERVAL: 354 MS
QTC INTERVAL: 413 MS

## 2021-12-01 ENCOUNTER — HOSPITAL ENCOUNTER (OUTPATIENT)
Dept: MAMMOGRAPHY | Facility: HOSPITAL | Age: 56
Discharge: HOME | End: 2021-12-01
Admitting: OBSTETRICS & GYNECOLOGY

## 2021-12-01 DIAGNOSIS — Z12.31 VISIT FOR SCREENING MAMMOGRAM: ICD-10-CM

## 2021-12-01 PROCEDURE — 77063 BREAST TOMOSYNTHESIS BI: CPT | Performed by: RADIOLOGY

## 2021-12-01 PROCEDURE — 77067 SCR MAMMO BI INCL CAD: CPT

## 2021-12-01 PROCEDURE — 77067 SCR MAMMO BI INCL CAD: CPT | Performed by: RADIOLOGY

## 2021-12-01 PROCEDURE — 77063 BREAST TOMOSYNTHESIS BI: CPT

## 2022-03-11 ENCOUNTER — TRANSCRIBE ORDERS (OUTPATIENT)
Dept: ADMINISTRATIVE | Facility: HOSPITAL | Age: 57
End: 2022-03-11

## 2022-03-11 ENCOUNTER — HOSPITAL ENCOUNTER (OUTPATIENT)
Dept: GENERAL RADIOLOGY | Facility: HOSPITAL | Age: 57
Discharge: HOME | End: 2022-03-11
Admitting: NURSE PRACTITIONER

## 2022-03-11 DIAGNOSIS — R07.81 RIB PAIN ON LEFT SIDE: ICD-10-CM

## 2022-03-11 DIAGNOSIS — R07.81 RIB PAIN ON LEFT SIDE: Primary | ICD-10-CM

## 2022-03-11 PROCEDURE — 71101 X-RAY EXAM UNILAT RIBS/CHEST: CPT

## 2022-04-18 ENCOUNTER — TELEPHONE (OUTPATIENT)
Dept: OBSTETRICS AND GYNECOLOGY | Facility: CLINIC | Age: 57
End: 2022-04-18

## 2022-08-03 ENCOUNTER — TRANSCRIBE ORDERS (OUTPATIENT)
Dept: LAB | Facility: HOSPITAL | Age: 57
End: 2022-08-03

## 2022-08-03 ENCOUNTER — LAB (OUTPATIENT)
Dept: LAB | Facility: HOSPITAL | Age: 57
End: 2022-08-03

## 2022-08-03 DIAGNOSIS — Z79.890 NEED FOR PROPHYLACTIC HORMONE REPLACEMENT THERAPY (POSTMENOPAUSAL): ICD-10-CM

## 2022-08-03 DIAGNOSIS — Z79.890 NEED FOR PROPHYLACTIC HORMONE REPLACEMENT THERAPY (POSTMENOPAUSAL): Primary | ICD-10-CM

## 2022-08-03 LAB
25(OH)D3 SERPL-MCNC: 43.3 NG/ML (ref 30–100)
ESTRADIOL SERPL HS-MCNC: <5 PG/ML
FERRITIN SERPL-MCNC: 136 NG/ML (ref 13–150)
FOLATE SERPL-MCNC: 13 NG/ML (ref 4.78–24.2)
HBA1C MFR BLD: 5.3 % (ref 4.8–5.6)
VIT B12 BLD-MCNC: 678 PG/ML (ref 211–946)

## 2022-08-03 PROCEDURE — 82670 ASSAY OF TOTAL ESTRADIOL: CPT

## 2022-08-03 PROCEDURE — 83036 HEMOGLOBIN GLYCOSYLATED A1C: CPT

## 2022-08-03 PROCEDURE — 82306 VITAMIN D 25 HYDROXY: CPT

## 2022-08-03 PROCEDURE — 82607 VITAMIN B-12: CPT

## 2022-08-03 PROCEDURE — 36415 COLL VENOUS BLD VENIPUNCTURE: CPT

## 2022-08-03 PROCEDURE — 82728 ASSAY OF FERRITIN: CPT

## 2022-08-03 PROCEDURE — 82746 ASSAY OF FOLIC ACID SERUM: CPT

## 2022-10-17 RX ORDER — CONJUGATED ESTROGENS 0.62 MG/1
TABLET, FILM COATED ORAL
Qty: 30 TABLET | Refills: 0 | Status: SHIPPED | OUTPATIENT
Start: 2022-10-17 | End: 2022-11-12

## 2022-10-17 NOTE — TELEPHONE ENCOUNTER
One month refill sent to get her through until he returns.   She needs a follow up appointment/annual scheduled.

## 2022-10-21 ENCOUNTER — TRANSCRIBE ORDERS (OUTPATIENT)
Dept: ADMINISTRATIVE | Facility: HOSPITAL | Age: 57
End: 2022-10-21

## 2022-10-21 DIAGNOSIS — Z12.31 VISIT FOR SCREENING MAMMOGRAM: Primary | ICD-10-CM

## 2022-11-12 RX ORDER — CONJUGATED ESTROGENS 0.62 MG/1
TABLET, FILM COATED ORAL
Qty: 30 TABLET | Refills: 0 | Status: SHIPPED | OUTPATIENT
Start: 2022-11-12 | End: 2022-12-30

## 2022-12-07 ENCOUNTER — HOSPITAL ENCOUNTER (OUTPATIENT)
Dept: GENERAL RADIOLOGY | Facility: HOSPITAL | Age: 57
Discharge: HOME | End: 2022-12-07
Admitting: NURSE PRACTITIONER

## 2022-12-07 ENCOUNTER — TRANSCRIBE ORDERS (OUTPATIENT)
Dept: ADMINISTRATIVE | Facility: HOSPITAL | Age: 57
End: 2022-12-07

## 2022-12-07 DIAGNOSIS — J06.9 RECURRENT UPPER RESPIRATORY TRACT INFECTION: Primary | ICD-10-CM

## 2022-12-07 PROCEDURE — 71046 X-RAY EXAM CHEST 2 VIEWS: CPT

## 2022-12-29 ENCOUNTER — HOSPITAL ENCOUNTER (OUTPATIENT)
Dept: MAMMOGRAPHY | Facility: HOSPITAL | Age: 57
Discharge: HOME | End: 2022-12-29
Admitting: NURSE PRACTITIONER

## 2022-12-29 DIAGNOSIS — Z12.31 VISIT FOR SCREENING MAMMOGRAM: ICD-10-CM

## 2022-12-29 PROCEDURE — 77063 BREAST TOMOSYNTHESIS BI: CPT | Performed by: RADIOLOGY

## 2022-12-29 PROCEDURE — 77067 SCR MAMMO BI INCL CAD: CPT | Performed by: RADIOLOGY

## 2022-12-29 PROCEDURE — 77067 SCR MAMMO BI INCL CAD: CPT

## 2022-12-29 PROCEDURE — 77063 BREAST TOMOSYNTHESIS BI: CPT

## 2022-12-30 RX ORDER — CONJUGATED ESTROGENS 0.62 MG/1
TABLET, FILM COATED ORAL
Qty: 30 TABLET | Refills: 0 | Status: SHIPPED | OUTPATIENT
Start: 2022-12-30 | End: 2023-01-25

## 2023-01-25 RX ORDER — CONJUGATED ESTROGENS 0.62 MG/1
TABLET, FILM COATED ORAL
Qty: 30 TABLET | Refills: 0 | Status: SHIPPED | OUTPATIENT
Start: 2023-01-25 | End: 2023-02-21

## 2023-02-21 ENCOUNTER — TRANSCRIBE ORDERS (OUTPATIENT)
Dept: ADMINISTRATIVE | Facility: HOSPITAL | Age: 58
End: 2023-02-21
Payer: COMMERCIAL

## 2023-02-21 ENCOUNTER — HOSPITAL ENCOUNTER (OUTPATIENT)
Dept: GENERAL RADIOLOGY | Facility: HOSPITAL | Age: 58
Discharge: HOME | End: 2023-02-21
Admitting: NURSE PRACTITIONER
Payer: COMMERCIAL

## 2023-02-21 DIAGNOSIS — S89.91XA RIGHT KNEE INJURY, INITIAL ENCOUNTER: Primary | ICD-10-CM

## 2023-02-21 PROCEDURE — 73562 X-RAY EXAM OF KNEE 3: CPT

## 2023-02-21 RX ORDER — CONJUGATED ESTROGENS 0.62 MG/1
TABLET, FILM COATED ORAL
Qty: 30 TABLET | Refills: 0 | Status: SHIPPED | OUTPATIENT
Start: 2023-02-21 | End: 2023-03-24

## 2023-03-06 ENCOUNTER — HOSPITAL ENCOUNTER (OUTPATIENT)
Dept: MAMMOGRAPHY | Facility: HOSPITAL | Age: 58
Discharge: HOME | End: 2023-03-06
Payer: COMMERCIAL

## 2023-03-06 ENCOUNTER — HOSPITAL ENCOUNTER (OUTPATIENT)
Dept: ULTRASOUND IMAGING | Facility: HOSPITAL | Age: 58
Discharge: HOME | End: 2023-03-06
Payer: COMMERCIAL

## 2023-03-06 DIAGNOSIS — R92.8 ABNORMAL MAMMOGRAM: ICD-10-CM

## 2023-03-06 PROCEDURE — 76642 ULTRASOUND BREAST LIMITED: CPT | Performed by: RADIOLOGY

## 2023-03-06 PROCEDURE — G0279 TOMOSYNTHESIS, MAMMO: HCPCS

## 2023-03-06 PROCEDURE — 77066 DX MAMMO INCL CAD BI: CPT | Performed by: RADIOLOGY

## 2023-03-06 PROCEDURE — 76642 ULTRASOUND BREAST LIMITED: CPT

## 2023-03-06 PROCEDURE — 77066 DX MAMMO INCL CAD BI: CPT

## 2023-03-06 PROCEDURE — 77062 BREAST TOMOSYNTHESIS BI: CPT | Performed by: RADIOLOGY

## 2023-03-24 RX ORDER — CONJUGATED ESTROGENS 0.62 MG/1
TABLET, FILM COATED ORAL
Qty: 30 TABLET | Refills: 0 | Status: SHIPPED | OUTPATIENT
Start: 2023-03-24

## 2023-04-20 RX ORDER — CONJUGATED ESTROGENS 0.62 MG/1
TABLET, FILM COATED ORAL
Qty: 30 TABLET | Refills: 0 | Status: SHIPPED | OUTPATIENT
Start: 2023-04-20

## 2023-05-15 ENCOUNTER — TRANSCRIBE ORDERS (OUTPATIENT)
Dept: ADMINISTRATIVE | Facility: HOSPITAL | Age: 58
End: 2023-05-15
Payer: COMMERCIAL

## 2023-05-15 ENCOUNTER — HOSPITAL ENCOUNTER (OUTPATIENT)
Dept: GENERAL RADIOLOGY | Facility: HOSPITAL | Age: 58
Discharge: HOME | End: 2023-05-15
Admitting: FAMILY MEDICINE
Payer: COMMERCIAL

## 2023-05-15 DIAGNOSIS — M54.2 CERVICALGIA: Primary | ICD-10-CM

## 2023-05-15 PROCEDURE — 72052 X-RAY EXAM NECK SPINE 6/>VWS: CPT

## 2023-09-29 ENCOUNTER — OFFICE VISIT (OUTPATIENT)
Dept: FAMILY MEDICINE CLINIC | Facility: CLINIC | Age: 58
End: 2023-09-29
Payer: COMMERCIAL

## 2023-09-29 VITALS
TEMPERATURE: 98.7 F | WEIGHT: 164.4 LBS | BODY MASS INDEX: 24.92 KG/M2 | SYSTOLIC BLOOD PRESSURE: 100 MMHG | HEIGHT: 68 IN | RESPIRATION RATE: 20 BRPM | OXYGEN SATURATION: 98 % | DIASTOLIC BLOOD PRESSURE: 66 MMHG | HEART RATE: 115 BPM

## 2023-09-29 DIAGNOSIS — R05.1 ACUTE COUGH: ICD-10-CM

## 2023-09-29 DIAGNOSIS — H66.002 ACUTE SUPPURATIVE OTITIS MEDIA OF LEFT EAR WITHOUT SPONTANEOUS RUPTURE OF TYMPANIC MEMBRANE, RECURRENCE NOT SPECIFIED: Primary | ICD-10-CM

## 2023-09-29 DIAGNOSIS — J02.9 SORE THROAT: ICD-10-CM

## 2023-09-29 DIAGNOSIS — J01.90 ACUTE SINUSITIS, RECURRENCE NOT SPECIFIED, UNSPECIFIED LOCATION: ICD-10-CM

## 2023-09-29 LAB
EXPIRATION DATE: NORMAL
FLUAV AG NPH QL: NEGATIVE
FLUBV AG NPH QL: NEGATIVE
INTERNAL CONTROL: NORMAL
Lab: NORMAL
S PYO AG THROAT QL: NEGATIVE
SARS-COV-2 AG UPPER RESP QL IA.RAPID: NOT DETECTED

## 2023-09-29 RX ORDER — ATORVASTATIN CALCIUM 40 MG/1
TABLET, FILM COATED ORAL
COMMUNITY
Start: 2022-06-28

## 2023-09-29 RX ORDER — FLUTICASONE FUROATE AND VILANTEROL 100; 25 UG/1; UG/1
1 POWDER RESPIRATORY (INHALATION) DAILY
Qty: 60 EACH | Refills: 2 | Status: CANCELLED | OUTPATIENT
Start: 2023-09-29

## 2023-09-29 RX ORDER — DEXTROMETHORPHAN HYDROBROMIDE AND PROMETHAZINE HYDROCHLORIDE 15; 6.25 MG/5ML; MG/5ML
5 SYRUP ORAL 4 TIMES DAILY PRN
Qty: 140 ML | Refills: 0 | Status: SHIPPED | OUTPATIENT
Start: 2023-09-29 | End: 2023-10-27

## 2023-09-29 RX ORDER — PREDNISONE 20 MG/1
20 TABLET ORAL DAILY
Qty: 5 TABLET | Refills: 0 | Status: SHIPPED | OUTPATIENT
Start: 2023-09-29 | End: 2023-10-04

## 2023-09-29 RX ORDER — AMOXICILLIN AND CLAVULANATE POTASSIUM 875; 125 MG/1; MG/1
1 TABLET, FILM COATED ORAL 2 TIMES DAILY
Qty: 20 TABLET | Refills: 0 | Status: SHIPPED | OUTPATIENT
Start: 2023-09-29 | End: 2023-10-09

## 2023-09-29 NOTE — PROGRESS NOTES
Office Note     Name: Domo Anderson    : 1965     MRN: [id number]     Primary Concern  Headache, Cough, Sore Throat (Ongoing since monday), Nasal Congestion, and Establish Care    Subjective     Subjective     History of Present Illness:  Domo Anderson is a 58 y.o. female who presents today to [facility] for  the following .    HPI:   Cough  This is a new problem. The current episode started in the past 7 days. The problem has been gradually worsening. Associated symptoms include chills, ear pain (left side), headaches, postnasal drip, a sore throat, shortness of breath and wheezing. Pertinent negatives include no chest pain or fever (higher than baseline). Exacerbated by: Allergens. Treatments tried: Using albuterol more this week. Recurrent sinus infections.   Sore Throat   This is a new problem. The current episode started in the past 7 days. The problem has been gradually worsening. There has been no fever. Associated symptoms include congestion, coughing (non productive), ear pain (left side), headaches and shortness of breath.         Review of Systems:   Review of Systems   Constitutional:  Positive for chills. Negative for fever (higher than baseline).   HENT:  Positive for congestion, ear pain (left side), postnasal drip and sore throat.    Respiratory:  Positive for cough (non productive), shortness of breath and wheezing.    Cardiovascular:  Negative for chest pain and palpitations.   Gastrointestinal:  Positive for nausea.   Neurological:  Positive for light-headedness and headache (mucinex night and day.). Negative for syncope (Not in the last year.   years ago, last episode.).   Psychiatric/Behavioral:  Positive for sleep disturbance (with cough).        The following portions of the patient's history were reviewed and updated as appropriate: allergies, current medications, past family history, past medical history, past social history, past surgical  history and problem list.    Past Medical History:   Past Medical History:   Diagnosis Date    Anxiety and depression 2012    Asthma 1995    Breast injury 12/2022    fall on her chest    Chronic constipation 2013    Fibromyalgia 1995    Hyperlipidemia 2020    IBS (irritable bowel syndrome)     MIxed but prim. constipation    Osteoarthritis     Vocal cord dysfunction 1997       Past Surgical History:   Past Surgical History:   Procedure Laterality Date    ABDOMINOPLASTY  2014    AUGMENTATION MAMMAPLASTY Bilateral 2014    HIP SURGERY Left 2017    Repair of tendons, [facility]    SINUS SURGERY Right 2006    deviated septum    TOTAL ABDOMINAL HYSTERECTOMY SALPINGO OOPHORECTOMY WITH ABDOMINAL SACROCOLPOPEXY Bilateral 2003    Along with mesh placement.  Dr. Lr done for abnormal bleeding    VAGINAL MESH REVISION  2008    Dr. Lr       Immunizations:   Immunization History   Administered Date(s) Administered    COVID-19 (MODERNA) 1st,2nd,3rd Dose Monovalent 03/10/2021, 04/07/2021    Hepatitis A 12/07/2018, 09/30/2019    Influenza, Unspecified 09/25/2017, 11/05/2018    Shingrix 05/21/2018    Tdap 09/30/2019        Current Medications:     Current Outpatient Medications:     albuterol (ACCUNEB) 1.25 MG/3ML nebulizer solution, , Disp: , Rfl:     atorvastatin (LIPITOR) 40 MG tablet, Atorvastatin Calcium 40 MG Oral Tablet QTY: 90  Days: 90 Refills: 0  Written: 06/28/22 Patient Instructions:, Disp: , Rfl:     azelastine (ASTEPRO) 0.15 % solution nasal spray, instill 2 sprays into each nostril twice a day, Disp: 30 mL, Rfl: 11    BREO ELLIPTA 100-25 MCG/INH aerosol powder , inhale 1 puff by mouth once daily, Disp: 60 each, Rfl: 2    Calcium 250 MG capsule, Take  by mouth., Disp: , Rfl:     celecoxib (CeleBREX) 200 MG capsule, Take 1 capsule by mouth 2 (Two) Times a Day., Disp: , Rfl:     Cetirizine HCl 10 MG capsule, Take  by mouth Daily., Disp: , Rfl:     fluticasone (FLONASE) 50 MCG/ACT nasal spray, 2 sprays in  "each nostril bid, Disp: 1 bottle, Rfl: 11    linaclotide (LINZESS) 145 MCG capsule capsule, Take 1 capsule by mouth Daily., Disp: , Rfl:     montelukast (SINGULAIR) 10 MG tablet, Take 1 tablet by mouth Every Night., Disp: 30 tablet, Rfl: 11    Multiple Vitamins-Minerals (MULTIVITAMIN ADULT EXTRA C PO), Take  by mouth., Disp: , Rfl:     PARoxetine CR (PAXIL-CR) 37.5 MG 24 hr tablet, Take 1 tablet by mouth Every Morning., Disp: , Rfl:     PROAIR RESPICLICK 108 (90 Base) MCG/ACT inhaler, Inhale 2 puffs Every 4 (Four) Hours As Needed for Wheezing or Shortness of Air., Disp: 1 inhaler, Rfl: 11    Semaglutide-Weight Management (WEGOVY SC), , Disp: , Rfl:     traZODone (DESYREL) 50 MG tablet, Take 1 tablet by mouth Every Night., Disp: , Rfl:     triamterene-hydrochlorothiazide (DYAZIDE) 37.5-25 MG per capsule, Take 1 capsule by mouth Daily., Disp: , Rfl:     promethazine-dextromethorphan (PROMETHAZINE-DM) 6.25-15 MG/5ML syrup, Take 5 mL by mouth 4 (Four) Times a Day As Needed for Cough., Disp: 140 mL, Rfl: 0    Allergies:   No Known Allergies    Family History:   Family History   Problem Relation Age of Onset    Heart disease Mother     No Known Problems Father     Heart disease Maternal Grandmother     Heart disease Maternal Grandfather     No Known Problems Paternal Grandmother     No Known Problems Paternal Grandfather     Ovarian cancer Neg Hx     Breast cancer Neg Hx        Social History:   Social History     Socioeconomic History    Marital status: Significant Other   Tobacco Use    Smoking status: Never    Smokeless tobacco: Never   Vaping Use    Vaping Use: Never used   Substance and Sexual Activity    Alcohol use: Yes     Comment: Socially    Drug use: No    Sexual activity: Yes     Partners: Male     Birth control/protection: Hysterectomy, Vasectomy           Objective     Objective     Vital Signs  /66   Pulse 115   Temp 98.7 °F (37.1 °C) (Infrared)   Resp 20   Ht 172.7 cm (68\")   Wt 74.6 kg (164 lb " "6.4 oz)   SpO2 98%   BMI 25.00 kg/m²   Estimated body mass index is 25 kg/m² as calculated from the following:    Height as of this encounter: 172.7 cm (68\").    Weight as of this encounter: 74.6 kg (164 lb 6.4 oz).    BMI is >= 25 and <30. (Overweight) The following options were offered after discussion;: weight loss educational material (shared in after visit summary) and exercise counseling/recommendations      Physical Exam:  Physical Exam  HENT:      Head: Normocephalic and atraumatic.      Right Ear: Tympanic membrane, ear canal and external ear normal.      Left Ear: Tenderness present. A middle ear effusion is present. There is mastoid tenderness. Tympanic membrane is erythematous. Tympanic membrane is not perforated.      Nose: Congestion present. No rhinorrhea.      Mouth/Throat:      Mouth: Mucous membranes are moist.      Pharynx: Oropharynx is clear. Posterior oropharyngeal erythema present. No oropharyngeal exudate.   Eyes:      Conjunctiva/sclera: Conjunctivae normal.      Pupils: Pupils are equal, round, and reactive to light.   Cardiovascular:      Rate and Rhythm: Regular rhythm. Tachycardia present.      Pulses: Normal pulses.      Heart sounds: Normal heart sounds.   Pulmonary:      Effort: Pulmonary effort is normal. No respiratory distress.      Comments: RLL diminished breath sounds  Musculoskeletal:      Cervical back: Normal range of motion and neck supple. No tenderness.   Lymphadenopathy:      Cervical: No cervical adenopathy.   Skin:     General: Skin is warm and dry.      Capillary Refill: Capillary refill takes less than 2 seconds.      Coloration: Skin is not jaundiced or pale.      Findings: No erythema or rash.   Neurological:      Mental Status: She is alert and oriented to person, place, and time.   Psychiatric:         Mood and Affect: Mood normal.         Behavior: Behavior normal.         Procedures     Results for orders placed or performed in visit on 09/29/23   POCT " SARS-CoV-2 Antigen LEA    Specimen: Swab   Result Value Ref Range    SARS Antigen Not Detected Not Detected, Presumptive Negative    Internal Control Passed Passed    Lot Number [id number]     Expiration Date 4/2/2024    POCT Influenza A/B    Specimen: Swab   Result Value Ref Range    Rapid Influenza A Ag Negative Negative    Rapid Influenza B Ag Negative Negative    Internal Control Passed Passed    Lot Number [id number]     Expiration Date 11/10/2025    POCT rapid strep A    Specimen: Swab   Result Value Ref Range    Rapid Strep A Screen Negative Negative, VALID, INVALID, Not Performed    Internal Control Passed Passed    Lot Number [id number]     Expiration Date 2/5/2026            Assessment / Plan    Assessment and Plan   Diagnoses and all orders for this visit:    1. Acute suppurative otitis media of left ear without spontaneous rupture of tympanic membrane, recurrence not specified (Primary)  -     amoxicillin-clavulanate (AUGMENTIN) 875-125 MG per tablet; Take 1 tablet by mouth 2 (Two) Times a Day for 10 days.  Dispense: 20 tablet; Refill: 0    2. Acute sinusitis, recurrence not specified, unspecified location  -     predniSONE (DELTASONE) 20 MG tablet; Take 1 tablet by mouth Daily for 5 days.  Dispense: 5 tablet; Refill: 0    3. Sore throat  -     POCT SARS-CoV-2 Antigen LEA  -     POCT Influenza A/B  -     POCT rapid strep A    4. Acute cough  -     POCT SARS-CoV-2 Antigen LEA  -     POCT Influenza A/B  -     POCT rapid strep A  -     promethazine-dextromethorphan (PROMETHAZINE-DM) 6.25-15 MG/5ML syrup; Take 5 mL by mouth 4 (Four) Times a Day As Needed for Cough.  Dispense: 140 mL; Refill: 0        Follow Up   Return in about 4 weeks (around 10/27/2023) for Annual.    Discussed possible differential diagnoses, testing, treatment, recommended non-pharmacological interventions, risks, warning signs to monitor for that would indicate need for follow-up in clinic or ER. If no improvement with these regimens or  you have new or worsening symptoms follow-up. Patient verbalizes understanding and agreement with plan of care. Denies further needs or concerns.       CHUCK Moreno-c  [facility] [place]

## 2023-10-10 ENCOUNTER — HOSPITAL ENCOUNTER (OUTPATIENT)
Dept: MAMMOGRAPHY | Facility: HOSPITAL | Age: 58
Discharge: HOME | End: 2023-10-10
Payer: COMMERCIAL

## 2023-10-10 ENCOUNTER — HOSPITAL ENCOUNTER (OUTPATIENT)
Dept: ULTRASOUND IMAGING | Facility: HOSPITAL | Age: 58
Discharge: HOME | End: 2023-10-10
Payer: COMMERCIAL

## 2023-10-10 DIAGNOSIS — R92.8 ABNORMAL MAMMOGRAM: ICD-10-CM

## 2023-10-10 PROCEDURE — 77066 DX MAMMO INCL CAD BI: CPT | Performed by: RADIOLOGY

## 2023-10-10 PROCEDURE — G0279 TOMOSYNTHESIS, MAMMO: HCPCS

## 2023-10-10 PROCEDURE — 77066 DX MAMMO INCL CAD BI: CPT

## 2023-10-10 PROCEDURE — 76642 ULTRASOUND BREAST LIMITED: CPT

## 2023-10-10 PROCEDURE — 76642 ULTRASOUND BREAST LIMITED: CPT | Performed by: RADIOLOGY

## 2023-10-10 PROCEDURE — 77062 BREAST TOMOSYNTHESIS BI: CPT | Performed by: RADIOLOGY

## 2023-10-27 ENCOUNTER — LAB (OUTPATIENT)
Dept: LAB | Facility: HOSPITAL | Age: 58
End: 2023-10-27
Payer: COMMERCIAL

## 2023-10-27 ENCOUNTER — OFFICE VISIT (OUTPATIENT)
Dept: FAMILY MEDICINE CLINIC | Facility: CLINIC | Age: 58
End: 2023-10-27
Payer: COMMERCIAL

## 2023-10-27 VITALS
SYSTOLIC BLOOD PRESSURE: 116 MMHG | BODY MASS INDEX: 24.74 KG/M2 | WEIGHT: 163.2 LBS | HEIGHT: 68 IN | DIASTOLIC BLOOD PRESSURE: 68 MMHG | TEMPERATURE: 98 F | HEART RATE: 78 BPM

## 2023-10-27 DIAGNOSIS — Z00.00 ANNUAL PHYSICAL EXAM: ICD-10-CM

## 2023-10-27 DIAGNOSIS — M25.461 PAIN AND SWELLING OF RIGHT KNEE: ICD-10-CM

## 2023-10-27 DIAGNOSIS — R53.83 OTHER FATIGUE: ICD-10-CM

## 2023-10-27 DIAGNOSIS — M25.561 PAIN AND SWELLING OF RIGHT KNEE: ICD-10-CM

## 2023-10-27 DIAGNOSIS — Z23 IMMUNIZATION DUE: ICD-10-CM

## 2023-10-27 DIAGNOSIS — R10.31 RIGHT LOWER QUADRANT ABDOMINAL PAIN: ICD-10-CM

## 2023-10-27 DIAGNOSIS — Z00.00 ANNUAL PHYSICAL EXAM: Primary | ICD-10-CM

## 2023-10-27 DIAGNOSIS — E55.9 VITAMIN D DEFICIENCY: ICD-10-CM

## 2023-10-27 PROBLEM — M25.579 ANKLE PAIN: Status: ACTIVE | Noted: 2021-07-07

## 2023-10-27 PROBLEM — M89.8X9 BONE PAIN: Status: ACTIVE | Noted: 2019-04-16

## 2023-10-27 LAB
25(OH)D3 SERPL-MCNC: 45.4 NG/ML (ref 30–100)
ALBUMIN SERPL-MCNC: 4.7 G/DL (ref 3.5–5.2)
ALBUMIN/GLOB SERPL: 1.6 G/DL
ALP SERPL-CCNC: 114 U/L (ref 39–117)
ALT SERPL W P-5'-P-CCNC: 30 U/L (ref 1–33)
ANION GAP SERPL CALCULATED.3IONS-SCNC: 11 MMOL/L (ref 5–15)
AST SERPL-CCNC: 23 U/L (ref 1–32)
BASOPHILS # BLD AUTO: 0.03 10*3/MM3 (ref 0–0.2)
BASOPHILS NFR BLD AUTO: 0.7 % (ref 0–1.5)
BILIRUB SERPL-MCNC: 0.4 MG/DL (ref 0–1.2)
BUN SERPL-MCNC: 14 MG/DL (ref 8–23)
BUN/CREAT SERPL: 16.3 (ref 7–25)
CALCIUM SPEC-SCNC: 9.8 MG/DL (ref 8.6–10.5)
CHLORIDE SERPL-SCNC: 100 MMOL/L (ref 98–107)
CHOLEST SERPL-MCNC: 160 MG/DL (ref 0–200)
CO2 SERPL-SCNC: 25 MMOL/L (ref 22–29)
CREAT SERPL-MCNC: 0.86 MG/DL (ref 0.57–1)
DEPRECATED RDW RBC AUTO: 38.7 FL (ref 37–54)
EGFRCR SERPLBLD CKD-EPI 2021: 77.5 ML/MIN/1.73
EOSINOPHIL # BLD AUTO: 0.1 10*3/MM3 (ref 0–0.4)
EOSINOPHIL NFR BLD AUTO: 2.3 % (ref 0.3–6.2)
ERYTHROCYTE [DISTWIDTH] IN BLOOD BY AUTOMATED COUNT: 11.9 % (ref 12.3–15.4)
GLOBULIN UR ELPH-MCNC: 2.9 GM/DL
GLUCOSE SERPL-MCNC: 83 MG/DL (ref 65–99)
HBA1C MFR BLD: 5.2 % (ref 4.8–5.6)
HCT VFR BLD AUTO: 42.6 % (ref 34–46.6)
HCV AB SER DONR QL: NORMAL
HDLC SERPL-MCNC: 58 MG/DL (ref 40–60)
HGB BLD-MCNC: 14.6 G/DL (ref 12–15.9)
IMM GRANULOCYTES # BLD AUTO: 0.01 10*3/MM3 (ref 0–0.05)
IMM GRANULOCYTES NFR BLD AUTO: 0.2 % (ref 0–0.5)
LDLC SERPL CALC-MCNC: 84 MG/DL (ref 0–100)
LDLC/HDLC SERPL: 1.42 {RATIO}
LYMPHOCYTES # BLD AUTO: 1.21 10*3/MM3 (ref 0.7–3.1)
LYMPHOCYTES NFR BLD AUTO: 27.7 % (ref 19.6–45.3)
MCH RBC QN AUTO: 30.7 PG (ref 26.6–33)
MCHC RBC AUTO-ENTMCNC: 34.3 G/DL (ref 31.5–35.7)
MCV RBC AUTO: 89.5 FL (ref 79–97)
MONOCYTES # BLD AUTO: 0.34 10*3/MM3 (ref 0.1–0.9)
MONOCYTES NFR BLD AUTO: 7.8 % (ref 5–12)
NEUTROPHILS NFR BLD AUTO: 2.68 10*3/MM3 (ref 1.7–7)
NEUTROPHILS NFR BLD AUTO: 61.3 % (ref 42.7–76)
NRBC BLD AUTO-RTO: 0 /100 WBC (ref 0–0.2)
PLATELET # BLD AUTO: 341 10*3/MM3 (ref 140–450)
PMV BLD AUTO: 9.1 FL (ref 6–12)
POTASSIUM SERPL-SCNC: 4 MMOL/L (ref 3.5–5.2)
PROT SERPL-MCNC: 7.6 G/DL (ref 6–8.5)
RBC # BLD AUTO: 4.76 10*6/MM3 (ref 3.77–5.28)
SODIUM SERPL-SCNC: 136 MMOL/L (ref 136–145)
TRIGL SERPL-MCNC: 99 MG/DL (ref 0–150)
TSH SERPL DL<=0.05 MIU/L-ACNC: 1.52 UIU/ML (ref 0.27–4.2)
VIT B12 BLD-MCNC: 1118 PG/ML (ref 211–946)
VLDLC SERPL-MCNC: 18 MG/DL (ref 5–40)
WBC NRBC COR # BLD: 4.37 10*3/MM3 (ref 3.4–10.8)

## 2023-10-27 PROCEDURE — 83036 HEMOGLOBIN GLYCOSYLATED A1C: CPT

## 2023-10-27 PROCEDURE — 82306 VITAMIN D 25 HYDROXY: CPT

## 2023-10-27 PROCEDURE — 86803 HEPATITIS C AB TEST: CPT

## 2023-10-27 PROCEDURE — 36415 COLL VENOUS BLD VENIPUNCTURE: CPT

## 2023-10-27 PROCEDURE — 80061 LIPID PANEL: CPT

## 2023-10-27 PROCEDURE — 84425 ASSAY OF VITAMIN B-1: CPT

## 2023-10-27 PROCEDURE — 80050 GENERAL HEALTH PANEL: CPT

## 2023-10-27 PROCEDURE — 82607 VITAMIN B-12: CPT

## 2023-10-27 NOTE — PROGRESS NOTES
"    Office Note     Name: Domo Anderson    : 1965     MRN: [id number]     Primary Concern  Annual physical      Subjective     Subjective     History of Present Illness:  Domo Anderson is a 58 y.o. female who presents today to [facility] for  the following .    HPI:   Annual physical exam: The patient feels that her overall physical and mental health is good.  She eats a well-balanced diet and does not have a designated exercise regimen.  She goes to bed around midnight and is up by 11 AM the next day.      Abdominal Pain  This is a new problem. The pain is located in the RLQ. Pain severity now: Ranges from mild to moderate. The abdominal pain does not radiate. Associated symptoms include arthralgias, constipation (IBS mixed) and myalgias. Pertinent negatives include no fever, hematuria, nausea or vomiting.   Knee Pain   The incident occurred 12 to 24 hours ago. Injury mechanism: \"It is probably from mopping the floors yesterday.\" The pain is present in the right knee. The pain is moderate. The pain has been Intermittent since onset. Pertinent negatives include no inability to bear weight, loss of motion, loss of sensation, muscle weakness, numbness or tingling. The symptoms are aggravated by movement.   Back Pain  The pain is present in the lumbar spine and sacro-iliac. The pain radiates to the left thigh. Pain severity now: Ranges from mild to moderate. The symptoms are aggravated by position. Pertinent negatives include no fever, numbness or tingling.         Review of Systems:   Review of Systems   Constitutional:  Negative for fever.   Gastrointestinal:  Positive for constipation (IBS mixed). Negative for nausea and vomiting.   Genitourinary:  Negative for hematuria.   Musculoskeletal:  Positive for arthralgias, back pain and myalgias.   Neurological:  Negative for tingling and numbness.       The following portions of the patient's history were reviewed and " updated as appropriate: allergies, current medications, past family history, past medical history, past social history, past surgical history and problem list.  Past Medical History:   Past Medical History:   Diagnosis Date    Anxiety and depression 2012    Asthma 1995    Breast injury 12/2022    fall on her chest    Chronic constipation 2013    Fibromyalgia 1995    Hyperlipidemia 2020    IBS (irritable bowel syndrome)     MIxed but prim. constipation    Osteoarthritis     Vocal cord dysfunction 1997       Past Surgical History:   Past Surgical History:   Procedure Laterality Date    ABDOMINOPLASTY  2014    AUGMENTATION MAMMAPLASTY Bilateral 2014    HIP SURGERY Left 2017    Repair of tendons, [facility]    SINUS SURGERY Right 2006    deviated septum    TOTAL ABDOMINAL HYSTERECTOMY SALPINGO OOPHORECTOMY WITH ABDOMINAL SACROCOLPOPEXY Bilateral 2003    Along with mesh placement.  Dr. Lr done for abnormal bleeding    VAGINAL MESH REVISION  2008    Dr. Lr       Immunizations:   Immunization History   Administered Date(s) Administered    COVID-19 (MODERNA) 1st,2nd,3rd Dose Monovalent 03/10/2021, 04/07/2021    COVID-19 (MODERNA) BIVALENT 12+YRS 10/06/2022    COVID-19 (MODERNA) Monovalent Original Booster 12/10/2021    COVID-19 (UNSPECIFIED) 03/17/2021, 04/07/2021    Flublok 18+yrs 12/10/2021    Fluzone (or Fluarix & Flulaval for VFC) >6mos 10/05/2020    Hepatitis A 12/07/2018, 09/30/2019    Influenza Injectable Mdck Pf Quad 10/06/2022    Influenza, Unspecified 09/25/2017, 11/05/2018, 10/01/2021    Shingrix 05/21/2018    Tdap 09/30/2019, 06/13/2022        Current Medications:     Current Outpatient Medications:     albuterol (ACCUNEB) 1.25 MG/3ML nebulizer solution, , Disp: , Rfl:     atorvastatin (LIPITOR) 40 MG tablet, Atorvastatin Calcium 40 MG Oral Tablet QTY: 90  Days: 90 Refills: 0  Written: 06/28/22 Patient Instructions:, Disp: , Rfl:     azelastine (ASTEPRO) 0.15 % solution nasal spray, instill 2  sprays into each nostril twice a day, Disp: 30 mL, Rfl: 11    BREO ELLIPTA 100-25 MCG/INH aerosol powder , inhale 1 puff by mouth once daily, Disp: 60 each, Rfl: 2    Calcium 250 MG capsule, Take  by mouth., Disp: , Rfl:     celecoxib (CeleBREX) 200 MG capsule, Take 1 capsule by mouth 2 (Two) Times a Day., Disp: , Rfl:     Cetirizine HCl 10 MG capsule, Take  by mouth Daily., Disp: , Rfl:     fluticasone (FLONASE) 50 MCG/ACT nasal spray, 2 sprays in each nostril bid, Disp: 1 bottle, Rfl: 11    linaclotide (LINZESS) 145 MCG capsule capsule, Take 1 capsule by mouth Daily., Disp: , Rfl:     montelukast (SINGULAIR) 10 MG tablet, Take 1 tablet by mouth Every Night., Disp: 30 tablet, Rfl: 11    Multiple Vitamins-Minerals (MULTIVITAMIN ADULT EXTRA C PO), Take  by mouth., Disp: , Rfl:     PARoxetine CR (PAXIL-CR) 37.5 MG 24 hr tablet, Take 1 tablet by mouth Every Morning., Disp: , Rfl:     PROAIR RESPICLICK 108 (90 Base) MCG/ACT inhaler, Inhale 2 puffs Every 4 (Four) Hours As Needed for Wheezing or Shortness of Air., Disp: 1 inhaler, Rfl: 11    Semaglutide-Weight Management (WEGOVY SC), , Disp: , Rfl:     traZODone (DESYREL) 50 MG tablet, Take 1 tablet by mouth Every Night., Disp: , Rfl:     triamterene-hydrochlorothiazide (DYAZIDE) 37.5-25 MG per capsule, Take 1 capsule by mouth Daily., Disp: , Rfl:     Allergies:   No Known Allergies    Family History:   Family History   Problem Relation Age of Onset    Heart disease Mother     No Known Problems Father     Heart disease Maternal Grandmother     Heart disease Maternal Grandfather     No Known Problems Paternal Grandmother     No Known Problems Paternal Grandfather     Ovarian cancer Neg Hx     Breast cancer Neg Hx        Social History:   Social History     Socioeconomic History    Marital status: Significant Other   Tobacco Use    Smoking status: Never     Passive exposure: Never    Smokeless tobacco: Never   Vaping Use    Vaping Use: Never used   Substance and Sexual  "Activity    Alcohol use: Yes     Comment: Socially    Drug use: No    Sexual activity: Yes     Partners: Male     Birth control/protection: Hysterectomy, Vasectomy           Objective     Objective     Vital Signs  /68   Pulse 78   Temp 98 °F (36.7 °C) (Infrared)   Ht 172.7 cm (67.99\")   Wt 74 kg (163 lb 3.2 oz)   BMI 24.82 kg/m²   Estimated body mass index is 24.82 kg/m² as calculated from the following:    Height as of this encounter: 172.7 cm (67.99\").    Weight as of this encounter: 74 kg (163 lb 3.2 oz).    BMI is within normal parameters. No other follow-up for BMI required.      Physical Exam:  Physical Exam  Constitutional:       General: She is not in acute distress.     Appearance: Normal appearance. She is not ill-appearing, toxic-appearing or diaphoretic.   HENT:      Head: Normocephalic and atraumatic.      Right Ear: Tympanic membrane, ear canal and external ear normal. No decreased hearing noted. There is no impacted cerumen.      Left Ear: Tympanic membrane, ear canal and external ear normal. No decreased hearing noted. There is no impacted cerumen.      Nose: Nose normal. No congestion or rhinorrhea.      Mouth/Throat:      Mouth: Mucous membranes are moist.      Pharynx: Oropharynx is clear. No oropharyngeal exudate or posterior oropharyngeal erythema.   Eyes:      General: No scleral icterus.        Right eye: No discharge.         Left eye: No discharge.      Extraocular Movements: Extraocular movements intact.      Conjunctiva/sclera: Conjunctivae normal.      Pupils: Pupils are equal, round, and reactive to light.   Cardiovascular:      Rate and Rhythm: Normal rate and regular rhythm.      Pulses: Normal pulses.      Heart sounds: Normal heart sounds.   Pulmonary:      Effort: Pulmonary effort is normal. No respiratory distress.      Breath sounds: Normal breath sounds.   Abdominal:      General: Bowel sounds are normal. There is no distension.      Palpations: Abdomen is soft.      " Tenderness: There is abdominal tenderness. There is rebound. There is no right CVA tenderness, left CVA tenderness or guarding.   Musculoskeletal:      Cervical back: Normal range of motion and neck supple.      Lumbar back: No swelling, edema, signs of trauma or tenderness.      Right knee: Swelling and ecchymosis present. Tenderness present.   Skin:     General: Skin is warm and dry.      Capillary Refill: Capillary refill takes less than 2 seconds.      Coloration: Skin is not jaundiced or pale.      Findings: No erythema.   Neurological:      Mental Status: She is alert and oriented to person, place, and time.      Coordination: Coordination normal.      Gait: Gait normal.      Deep Tendon Reflexes: Reflexes normal.   Psychiatric:         Mood and Affect: Mood normal.         Behavior: Behavior normal.         Thought Content: Thought content normal.         Procedures     Results for orders placed or performed in visit on 09/29/23   POCT SARS-CoV-2 Antigen LEA    Specimen: Swab   Result Value Ref Range    SARS Antigen Not Detected Not Detected, Presumptive Negative    Internal Control Passed Passed    Lot Number [id number]     Expiration Date 4/2/2024    POCT Influenza A/B    Specimen: Swab   Result Value Ref Range    Rapid Influenza A Ag Negative Negative    Rapid Influenza B Ag Negative Negative    Internal Control Passed Passed    Lot Number [id number]     Expiration Date 11/10/2025    POCT rapid strep A    Specimen: Swab   Result Value Ref Range    Rapid Strep A Screen Negative Negative, VALID, INVALID, Not Performed    Internal Control Passed Passed    Lot Number [id number]     Expiration Date 2/5/2026          PHQ-2 total score: 0  MARCIE-7 total score: 5  Assessment / Plan    Assessment and Plan   Diagnoses and all orders for this visit:    1. Annual physical exam (Primary)  -     CBC & Differential; Future  -     Comprehensive Metabolic Panel; Future  -     Hemoglobin A1c; Future  -     Lipid Panel; Future  -      TSH Rfx On Abnormal To Free T4; Future  -     Vitamin D,25-Hydroxy; Future  -     Hepatitis C antibody; Future  -     Ambulatory Referral to Obstetrics / Gynecology    2. Right lower quadrant abdominal pain  -     US Abdomen Complete; Future  -     Shared decision making: Patient's medical profile is positive for IBS.  Patient experiences constipation regularly.  Patient instructed to increase fluids.  Due to physical exam findings I am recommending an ultrasound of the abdomen.  Patient is in agreement with this plan of care.    3. Pain and swelling of right knee  -     XR Knee 1 or 2 View Right; Future  -     Shared decision making: Patient's medical profile is positive for osteoarthritis of the right knee.  Patient believes the current status of her knee is related to working yesterday and doing a lot of mopping.  We will obtain an x-ray today, and the patient will initiate RICE therapy.  If symptom does not resolve in 2 weeks, she will notify PCP.    4. Other fatigue  -     CBC & Differential; Future  -     Comprehensive Metabolic Panel; Future  -     TSH Rfx On Abnormal To Free T4; Future  -     Vitamin D,25-Hydroxy; Future  -     Vitamin B12; Future  -     Vitamin B1, Whole Blood; Future    5. Vitamin D deficiency  -     Vitamin D,25-Hydroxy; Future    6. Immunization due  -     Patient will follow-up with her pharmacy for vaccinations.  -     Vitamin B12; Future  -     Vitamin B1, Whole Blood; Future    Discussed possible differential diagnoses, testing, treatment, recommended non-pharmacological interventions, risks, warning signs to monitor for that would indicate need for follow-up in clinic or ER. If no improvement with these regimens or you have new or worsening symptoms follow-up. Patient verbalizes understanding and agreement with plan of care. Denies further needs or concerns.     Follow Up   Return in about 1 year (around 10/27/2024), or if symptoms worsen or fail to improve, for  Annual.        SEGUNDO MorenoP-c  [facility] [place]

## 2023-11-04 LAB — VIT B1 BLD-SCNC: 101.7 NMOL/L (ref 66.5–200)

## 2023-12-05 ENCOUNTER — HOSPITAL ENCOUNTER (OUTPATIENT)
Dept: ULTRASOUND IMAGING | Facility: HOSPITAL | Age: 58
Discharge: HOME | End: 2023-12-05
Payer: COMMERCIAL

## 2023-12-05 DIAGNOSIS — R10.31 RIGHT LOWER QUADRANT ABDOMINAL PAIN: ICD-10-CM

## 2023-12-05 PROCEDURE — 76700 US EXAM ABDOM COMPLETE: CPT

## 2024-07-08 ENCOUNTER — PRE-ADMISSION TESTING (OUTPATIENT)
Dept: PREADMISSION TESTING | Facility: HOSPITAL | Age: 59
End: 2024-07-08
Payer: COMMERCIAL

## 2024-07-08 VITALS — BODY MASS INDEX: 26.6 KG/M2 | WEIGHT: 175.49 LBS | HEIGHT: 68 IN

## 2024-07-08 LAB
ALBUMIN SERPL-MCNC: 3.9 G/DL (ref 3.5–5.2)
ALBUMIN/GLOB SERPL: 1.6 G/DL
ALP SERPL-CCNC: 108 U/L (ref 39–117)
ALT SERPL W P-5'-P-CCNC: 23 U/L (ref 1–33)
ANION GAP SERPL CALCULATED.3IONS-SCNC: 7 MMOL/L (ref 5–15)
AST SERPL-CCNC: 20 U/L (ref 1–32)
BILIRUB SERPL-MCNC: 0.3 MG/DL (ref 0–1.2)
BUN SERPL-MCNC: 13 MG/DL (ref 8–23)
BUN/CREAT SERPL: 15.9 (ref 7–25)
CALCIUM SPEC-SCNC: 9 MG/DL (ref 8.6–10.5)
CHLORIDE SERPL-SCNC: 103 MMOL/L (ref 98–107)
CO2 SERPL-SCNC: 29 MMOL/L (ref 22–29)
CREAT SERPL-MCNC: 0.82 MG/DL (ref 0.57–1)
DEPRECATED RDW RBC AUTO: 39.5 FL (ref 37–54)
EGFRCR SERPLBLD CKD-EPI 2021: 81.5 ML/MIN/1.73
ERYTHROCYTE [DISTWIDTH] IN BLOOD BY AUTOMATED COUNT: 11.9 % (ref 12.3–15.4)
GLOBULIN UR ELPH-MCNC: 2.4 GM/DL
GLUCOSE SERPL-MCNC: 92 MG/DL (ref 65–99)
HCT VFR BLD AUTO: 42.1 % (ref 34–46.6)
HGB BLD-MCNC: 13.7 G/DL (ref 12–15.9)
MCH RBC QN AUTO: 29.5 PG (ref 26.6–33)
MCHC RBC AUTO-ENTMCNC: 32.5 G/DL (ref 31.5–35.7)
MCV RBC AUTO: 90.5 FL (ref 79–97)
PLATELET # BLD AUTO: 226 10*3/MM3 (ref 140–450)
PMV BLD AUTO: 8.6 FL (ref 6–12)
POTASSIUM SERPL-SCNC: 3.9 MMOL/L (ref 3.5–5.2)
PROT SERPL-MCNC: 6.3 G/DL (ref 6–8.5)
RBC # BLD AUTO: 4.65 10*6/MM3 (ref 3.77–5.28)
SODIUM SERPL-SCNC: 139 MMOL/L (ref 136–145)
WBC NRBC COR # BLD AUTO: 3.23 10*3/MM3 (ref 3.4–10.8)

## 2024-07-08 PROCEDURE — 80053 COMPREHEN METABOLIC PANEL: CPT

## 2024-07-08 PROCEDURE — 93005 ELECTROCARDIOGRAM TRACING: CPT

## 2024-07-08 PROCEDURE — 85027 COMPLETE CBC AUTOMATED: CPT

## 2024-07-08 PROCEDURE — 36415 COLL VENOUS BLD VENIPUNCTURE: CPT

## 2024-07-08 PROCEDURE — 93010 ELECTROCARDIOGRAM REPORT: CPT | Performed by: STUDENT IN AN ORGANIZED HEALTH CARE EDUCATION/TRAINING PROGRAM

## 2024-07-08 NOTE — PAT
An arrival time for procedure was not provided during PAT visit. If patient had any questions or concerns about their arrival time, they were instructed to contact their surgeon/physician.  Additionally, if the patient referred to an arrival time that was acquired from their my chart account, patient was encouraged to verify that time with their surgeon/physician. Arrival times are NOT provided in Pre Admission Testing Department.    Patient viewed general PAT education video as instructed in their preoperative information received from their surgeon.  Patient stated the general PAT education video was viewed in its entirety and survey completed.  Copies of PAT general education handouts (Incentive Spirometry, Meds to Beds Program, Patient Belongings, Pre-op skin preparation instructions, Blood Glucose testing, Visitor policy, Surgery FAQ, Code H) distributed to patient if not printed. Education related to the PAT pass and skin preparation for surgery (if applicable) completed in PAT as a reinforcement to PAT education video. Patient instructed to return PAT pass provided today as well as completed skin preparation sheet (if applicable) on the day of procedure.     Additionally if patient had not viewed video yet but intended to view it at home or in our waiting area, then referred them to the handout with QR code/link provided during PAT visit.  Instructed patient to complete survey after viewing the video in its entirety.  Encouraged patient/family to read PAT general education handouts thoroughly and notify PAT staff with any questions or concerns. Patient verbalized understanding of all information and priority content.    Patient denies any current skin issues.     It was noted during Pre Admission Testing that patient was wearing some form of fingernail polish (gel/regular) and/or acrylic/artificial nails.  Patient was told that polish and/or artificial nails must be removed for surgery.  If a patient had  recent manicure, and would rather not remove polish or artificial nails. Then the minimum requirement is that the polish/artificial nails must be removed from the middle finger on each hand.  Patient verbalized understanding.    If patient was having surgery on an upper extremity, then the patient was instructed that fingernail polish/artificial fingernails must be removed for surgery.  NO EXCEPTIONS.  Patient verbalized understanding.    If patient was having surgery on a lower extremity, then the patient was instructed that toenail polish on both extremities must be removed for surgery.  NO EXCEPTIONS. Patient verbalized understanding.    Patient to apply Chlorhexadine wipes  to surgical area (as instructed) the night before procedure and the AM of procedure. Wipes provided.

## 2024-07-09 LAB
QT INTERVAL: 408 MS
QTC INTERVAL: 431 MS

## 2024-07-15 ENCOUNTER — HOSPITAL ENCOUNTER (OUTPATIENT)
Facility: HOSPITAL | Age: 59
Discharge: HOME | End: 2024-07-15
Attending: SURGERY | Admitting: SURGERY
Payer: COMMERCIAL

## 2024-07-15 ENCOUNTER — ANESTHESIA (OUTPATIENT)
Dept: PERIOP | Facility: HOSPITAL | Age: 59
End: 2024-07-15
Payer: COMMERCIAL

## 2024-07-15 ENCOUNTER — ANESTHESIA EVENT CONVERTED (OUTPATIENT)
Dept: ANESTHESIOLOGY | Facility: HOSPITAL | Age: 59
End: 2024-07-15
Payer: COMMERCIAL

## 2024-07-15 ENCOUNTER — ANESTHESIA EVENT (OUTPATIENT)
Dept: PERIOP | Facility: HOSPITAL | Age: 59
End: 2024-07-15
Payer: COMMERCIAL

## 2024-07-15 VITALS
SYSTOLIC BLOOD PRESSURE: 133 MMHG | DIASTOLIC BLOOD PRESSURE: 82 MMHG | RESPIRATION RATE: 18 BRPM | OXYGEN SATURATION: 95 % | TEMPERATURE: 97.8 F | HEART RATE: 107 BPM

## 2024-07-15 DIAGNOSIS — K40.20 BILATERAL INGUINAL HERNIA: ICD-10-CM

## 2024-07-15 PROCEDURE — 25010000002 FENTANYL CITRATE (PF) 100 MCG/2ML SOLUTION: Performed by: NURSE ANESTHETIST, CERTIFIED REGISTERED

## 2024-07-15 PROCEDURE — 25010000002 FENTANYL CITRATE (PF) 50 MCG/ML SOLUTION

## 2024-07-15 PROCEDURE — 25010000002 BUPIVACAINE (PF) 0.25 % SOLUTION: Performed by: NURSE ANESTHETIST, CERTIFIED REGISTERED

## 2024-07-15 PROCEDURE — 25010000002 PROPOFOL 10 MG/ML EMULSION: Performed by: NURSE ANESTHETIST, CERTIFIED REGISTERED

## 2024-07-15 PROCEDURE — 25010000002 CEFAZOLIN PER 500 MG: Performed by: SURGERY

## 2024-07-15 PROCEDURE — 25810000003 SODIUM CHLORIDE PER 500 ML: Performed by: SURGERY

## 2024-07-15 PROCEDURE — 25010000002 SUGAMMADEX 200 MG/2ML SOLUTION: Performed by: NURSE ANESTHETIST, CERTIFIED REGISTERED

## 2024-07-15 PROCEDURE — 25010000002 ONDANSETRON PER 1 MG: Performed by: NURSE ANESTHETIST, CERTIFIED REGISTERED

## 2024-07-15 PROCEDURE — C1781 MESH (IMPLANTABLE): HCPCS | Performed by: SURGERY

## 2024-07-15 PROCEDURE — 88302 TISSUE EXAM BY PATHOLOGIST: CPT | Performed by: SURGERY

## 2024-07-15 PROCEDURE — 25010000002 DEXAMETHASONE SODIUM PHOSPHATE 10 MG/ML SOLUTION: Performed by: NURSE ANESTHETIST, CERTIFIED REGISTERED

## 2024-07-15 PROCEDURE — 25810000003 LACTATED RINGERS PER 1000 ML: Performed by: ANESTHESIOLOGY

## 2024-07-15 DEVICE — MESH FLUT SHT 3X6IN: Type: IMPLANTABLE DEVICE | Site: ABDOMEN | Status: FUNCTIONAL

## 2024-07-15 RX ORDER — DROPERIDOL 2.5 MG/ML
0.62 INJECTION, SOLUTION INTRAMUSCULAR; INTRAVENOUS ONCE AS NEEDED
Status: DISCONTINUED | OUTPATIENT
Start: 2024-07-15 | End: 2024-07-15 | Stop reason: HOSPADM

## 2024-07-15 RX ORDER — DEXAMETHASONE SODIUM PHOSPHATE 10 MG/ML
INJECTION, SOLUTION INTRAMUSCULAR; INTRAVENOUS AS NEEDED
Status: DISCONTINUED | OUTPATIENT
Start: 2024-07-15 | End: 2024-07-15 | Stop reason: SURG

## 2024-07-15 RX ORDER — MIDAZOLAM HYDROCHLORIDE 1 MG/ML
1 INJECTION INTRAMUSCULAR; INTRAVENOUS
Status: DISCONTINUED | OUTPATIENT
Start: 2024-07-15 | End: 2024-07-15 | Stop reason: HOSPADM

## 2024-07-15 RX ORDER — DEXAMETHASONE SODIUM PHOSPHATE 10 MG/ML
INJECTION, SOLUTION INTRAMUSCULAR; INTRAVENOUS
Status: COMPLETED | OUTPATIENT
Start: 2024-07-15 | End: 2024-07-15

## 2024-07-15 RX ORDER — FENTANYL CITRATE 50 UG/ML
50 INJECTION, SOLUTION INTRAMUSCULAR; INTRAVENOUS
Status: DISCONTINUED | OUTPATIENT
Start: 2024-07-15 | End: 2024-07-15 | Stop reason: HOSPADM

## 2024-07-15 RX ORDER — SODIUM CHLORIDE 0.9 % (FLUSH) 0.9 %
10 SYRINGE (ML) INJECTION EVERY 12 HOURS SCHEDULED
Status: DISCONTINUED | OUTPATIENT
Start: 2024-07-15 | End: 2024-07-15 | Stop reason: HOSPADM

## 2024-07-15 RX ORDER — SODIUM CHLORIDE 0.9 % (FLUSH) 0.9 %
10 SYRINGE (ML) INJECTION AS NEEDED
Status: DISCONTINUED | OUTPATIENT
Start: 2024-07-15 | End: 2024-07-15 | Stop reason: HOSPADM

## 2024-07-15 RX ORDER — ROCURONIUM BROMIDE 10 MG/ML
INJECTION, SOLUTION INTRAVENOUS AS NEEDED
Status: DISCONTINUED | OUTPATIENT
Start: 2024-07-15 | End: 2024-07-15 | Stop reason: SURG

## 2024-07-15 RX ORDER — FENTANYL CITRATE 50 UG/ML
INJECTION, SOLUTION INTRAMUSCULAR; INTRAVENOUS
Status: COMPLETED
Start: 2024-07-15 | End: 2024-07-15

## 2024-07-15 RX ORDER — OXYCODONE HYDROCHLORIDE AND ACETAMINOPHEN 5; 325 MG/1; MG/1
1 TABLET ORAL EVERY 8 HOURS PRN
Qty: 10 TABLET | Refills: 0 | Status: SHIPPED | OUTPATIENT
Start: 2024-07-15

## 2024-07-15 RX ORDER — FAMOTIDINE 10 MG/ML
20 INJECTION, SOLUTION INTRAVENOUS ONCE
Status: CANCELLED | OUTPATIENT
Start: 2024-07-15 | End: 2024-07-15

## 2024-07-15 RX ORDER — ONDANSETRON 2 MG/ML
INJECTION INTRAMUSCULAR; INTRAVENOUS AS NEEDED
Status: DISCONTINUED | OUTPATIENT
Start: 2024-07-15 | End: 2024-07-15 | Stop reason: SURG

## 2024-07-15 RX ORDER — FENTANYL CITRATE 50 UG/ML
INJECTION, SOLUTION INTRAMUSCULAR; INTRAVENOUS AS NEEDED
Status: DISCONTINUED | OUTPATIENT
Start: 2024-07-15 | End: 2024-07-15 | Stop reason: SURG

## 2024-07-15 RX ORDER — EPHEDRINE SULFATE 50 MG/ML
INJECTION INTRAVENOUS AS NEEDED
Status: DISCONTINUED | OUTPATIENT
Start: 2024-07-15 | End: 2024-07-15 | Stop reason: SURG

## 2024-07-15 RX ORDER — PROPOFOL 10 MG/ML
VIAL (ML) INTRAVENOUS AS NEEDED
Status: DISCONTINUED | OUTPATIENT
Start: 2024-07-15 | End: 2024-07-15 | Stop reason: SURG

## 2024-07-15 RX ORDER — SODIUM CHLORIDE 9 MG/ML
40 INJECTION, SOLUTION INTRAVENOUS AS NEEDED
Status: DISCONTINUED | OUTPATIENT
Start: 2024-07-15 | End: 2024-07-15 | Stop reason: HOSPADM

## 2024-07-15 RX ORDER — SODIUM CHLORIDE, SODIUM LACTATE, POTASSIUM CHLORIDE, CALCIUM CHLORIDE 600; 310; 30; 20 MG/100ML; MG/100ML; MG/100ML; MG/100ML
9 INJECTION, SOLUTION INTRAVENOUS CONTINUOUS
Status: DISCONTINUED | OUTPATIENT
Start: 2024-07-15 | End: 2024-07-15 | Stop reason: HOSPADM

## 2024-07-15 RX ORDER — DEXMEDETOMIDINE HYDROCHLORIDE 100 UG/ML
INJECTION, SOLUTION INTRAVENOUS AS NEEDED
Status: DISCONTINUED | OUTPATIENT
Start: 2024-07-15 | End: 2024-07-15 | Stop reason: SURG

## 2024-07-15 RX ORDER — SODIUM CHLORIDE 9 MG/ML
INJECTION, SOLUTION INTRAVENOUS AS NEEDED
Status: DISCONTINUED | OUTPATIENT
Start: 2024-07-15 | End: 2024-07-15 | Stop reason: HOSPADM

## 2024-07-15 RX ORDER — FAMOTIDINE 20 MG/1
20 TABLET, FILM COATED ORAL ONCE
Status: COMPLETED | OUTPATIENT
Start: 2024-07-15 | End: 2024-07-15

## 2024-07-15 RX ORDER — BUPIVACAINE HYDROCHLORIDE 2.5 MG/ML
INJECTION, SOLUTION EPIDURAL; INFILTRATION; INTRACAUDAL
Status: COMPLETED | OUTPATIENT
Start: 2024-07-15 | End: 2024-07-15

## 2024-07-15 RX ORDER — LIDOCAINE HYDROCHLORIDE 10 MG/ML
0.5 INJECTION, SOLUTION EPIDURAL; INFILTRATION; INTRACAUDAL; PERINEURAL ONCE AS NEEDED
Status: COMPLETED | OUTPATIENT
Start: 2024-07-15 | End: 2024-07-15

## 2024-07-15 RX ORDER — LIDOCAINE HYDROCHLORIDE 10 MG/ML
INJECTION, SOLUTION EPIDURAL; INFILTRATION; INTRACAUDAL; PERINEURAL AS NEEDED
Status: DISCONTINUED | OUTPATIENT
Start: 2024-07-15 | End: 2024-07-15 | Stop reason: SURG

## 2024-07-15 RX ADMIN — FENTANYL CITRATE 50 MCG: 50 INJECTION, SOLUTION INTRAMUSCULAR; INTRAVENOUS at 11:53

## 2024-07-15 RX ADMIN — FENTANYL CITRATE 50 MCG: 50 INJECTION, SOLUTION INTRAMUSCULAR; INTRAVENOUS at 11:12

## 2024-07-15 RX ADMIN — FENTANYL CITRATE 25 MCG: 50 INJECTION, SOLUTION INTRAMUSCULAR; INTRAVENOUS at 10:55

## 2024-07-15 RX ADMIN — FENTANYL CITRATE 25 MCG: 50 INJECTION, SOLUTION INTRAMUSCULAR; INTRAVENOUS at 08:14

## 2024-07-15 RX ADMIN — LIDOCAINE HYDROCHLORIDE 50 MG: 10 INJECTION, SOLUTION EPIDURAL; INFILTRATION; INTRACAUDAL; PERINEURAL at 08:14

## 2024-07-15 RX ADMIN — FAMOTIDINE 20 MG: 20 TABLET, FILM COATED ORAL at 07:23

## 2024-07-15 RX ADMIN — PROPOFOL 25 MCG/KG/MIN: 10 INJECTION, EMULSION INTRAVENOUS at 08:24

## 2024-07-15 RX ADMIN — EPHEDRINE SULFATE 5 MG: 50 INJECTION INTRAVENOUS at 09:26

## 2024-07-15 RX ADMIN — PROPOFOL 200 MG: 10 INJECTION, EMULSION INTRAVENOUS at 08:14

## 2024-07-15 RX ADMIN — DEXAMETHASONE SODIUM PHOSPHATE 4 MG: 10 INJECTION, SOLUTION INTRAMUSCULAR; INTRAVENOUS at 08:16

## 2024-07-15 RX ADMIN — EPHEDRINE SULFATE 5 MG: 50 INJECTION INTRAVENOUS at 09:29

## 2024-07-15 RX ADMIN — LIDOCAINE HYDROCHLORIDE 0.2 ML: 10 INJECTION, SOLUTION EPIDURAL; INFILTRATION; INTRACAUDAL; PERINEURAL at 07:25

## 2024-07-15 RX ADMIN — ROCURONIUM BROMIDE 20 MG: 10 INJECTION INTRAVENOUS at 09:09

## 2024-07-15 RX ADMIN — BUPIVACAINE HYDROCHLORIDE 60 ML: 2.5 INJECTION, SOLUTION EPIDURAL; INFILTRATION; INTRACAUDAL; PERINEURAL at 08:16

## 2024-07-15 RX ADMIN — ROCURONIUM BROMIDE 50 MG: 10 INJECTION INTRAVENOUS at 08:14

## 2024-07-15 RX ADMIN — MUPIROCIN 1 APPLICATION: 20 OINTMENT TOPICAL at 07:26

## 2024-07-15 RX ADMIN — DEXMEDETOMIDINE HYDROCHLORIDE 40 MCG: 100 INJECTION, SOLUTION INTRAVENOUS at 08:16

## 2024-07-15 RX ADMIN — SUGAMMADEX 150 MG: 100 INJECTION, SOLUTION INTRAVENOUS at 10:53

## 2024-07-15 RX ADMIN — FENTANYL CITRATE 25 MCG: 50 INJECTION, SOLUTION INTRAMUSCULAR; INTRAVENOUS at 09:47

## 2024-07-15 RX ADMIN — FENTANYL CITRATE 25 MCG: 50 INJECTION, SOLUTION INTRAMUSCULAR; INTRAVENOUS at 09:08

## 2024-07-15 RX ADMIN — ONDANSETRON 4 MG: 2 INJECTION INTRAMUSCULAR; INTRAVENOUS at 10:47

## 2024-07-15 RX ADMIN — EPHEDRINE SULFATE 5 MG: 50 INJECTION INTRAVENOUS at 08:41

## 2024-07-15 RX ADMIN — SODIUM CHLORIDE, POTASSIUM CHLORIDE, SODIUM LACTATE AND CALCIUM CHLORIDE 9 ML/HR: 600; 310; 30; 20 INJECTION, SOLUTION INTRAVENOUS at 07:26

## 2024-07-15 RX ADMIN — DEXAMETHASONE SODIUM PHOSPHATE 4 MG: 10 INJECTION, SOLUTION INTRAMUSCULAR; INTRAVENOUS at 08:24

## 2024-07-15 RX ADMIN — SODIUM CHLORIDE 2000 MG: 900 INJECTION INTRAVENOUS at 08:22

## 2024-07-15 NOTE — ANESTHESIA POSTPROCEDURE EVALUATION
Patient: Domo Anderson    Procedure Summary       Date: 07/15/24 Room / Location:  [facility]    Anesthesia Start: 0811 Anesthesia Stop: 1110    Procedure: INGUINAL HERNIA REPAIR BILATERAL WITH MESH (Bilateral: Abdomen) Diagnosis:     Surgeons: Joshua Rose MD Provider: Scott Watters Jr., MD    Anesthesia Type: general with block ASA Status: 2            Anesthesia Type: general with block    Vitals  Vitals Value Taken Time   BP     Temp     Pulse 78 07/15/24 1108   Resp     SpO2 99 % 07/15/24 1108   Vitals shown include unfiled device data.        Post Anesthesia Care and Evaluation    Patient location during evaluation: PACU  Patient participation: waiting for patient participation  Level of consciousness: sleepy but conscious  Pain management: adequate    Airway patency: patent  Anesthetic complications: No anesthetic complications  PONV Status: none  Cardiovascular status: hemodynamically stable and acceptable  Respiratory status: nonlabored ventilation, acceptable, nasal cannula and oral airway  Hydration status: acceptable

## 2024-07-15 NOTE — ANESTHESIA PROCEDURE NOTES
Midaxillary TAP blocks      Patient reassessed immediately prior to procedure    Patient location during procedure: OR  Start time: 7/15/2024 8:12 AM  Stop time: 7/15/2024 8:16 AM  Reason for block: at surgeon's request and post-op pain management  Performed by  Anesthesiologist: Scott Watters Jr., MD  Assisted by: Rigoberto Daugherty RN  Preanesthetic Checklist  Completed: patient identified, IV checked, site marked, risks and benefits discussed, surgical consent, monitors and equipment checked, pre-op evaluation and timeout performed  Prep:  Pt Position: supine  Sterile barriers:cap, gloves, mask and washed/disinfected hands  Prep: ChloraPrep  Patient monitoring: blood pressure monitoring, continuous pulse oximetry and EKG  Procedure    Sedation: yes  Performed under: general  Guidance:ultrasound guided  Images:still images obtained, printed/placed on chart    Laterality:Bilateral  Block Type:TAP  Injection Technique:single-shot  Needle Type:short-bevel and echogenic  Needle Gauge:20 G  Resistance on Injection: none    Medications Used: dexamethasone sodium phosphate injection - Injection   4 mg - 7/15/2024 8:16:00 AM  bupivacaine PF (MARCAINE) 0.25 % injection - Injection   60 mL - 7/15/2024 8:16:00 AM      Medications  Comment:Block Injection:  LA dose divided between Right and Left block    PF Dexmedetomidine 20 mcg added to each side of block.    Post Assessment  Injection Assessment: negative aspiration for heme, incremental injection and no paresthesia on injection  Patient Tolerance:comfortable throughout block  Complications:no  Additional Notes    Mid-Axillary/Lateral TAPs    A high-frequency linear transducer, with sterile cover, was placed in the midaxillary line between the ASIS and costal margin. The External Oblique Muscle (EOM), Internal Oblique Muscle (IOM), Transverse Abdominus Muscle (LYNCH), and Peritoneum were identified. The insertion site was prepped in sterile fashion and then localized  "with 2-5 ml of 1% Lidocaine. Using ultrasound-guidance, a 20-gauge B-Torres 4\" Ultraplex 360 non-stimulating echogenic needle was advanced in plane, from medial to lateral, until the tip of the needle was in the fascial plane between the IOM and LYNCH. 1-3ml of preservative free normal saline was used to hydro-dissect the fascial planes. After the fascial plane was verified, the local anesthetic (LA) was injected. The procedure was repeated on the opposite side for bilateral coverage. Aspiration every 5 ml to prevent intravascular injection. Injection was completed with negative aspiration of blood and negative intravascular injection. Injection pressures were normal with minimal resistance. Midaxillary TAPs should reach intercostal nerves T10- T11 and the subcostal nerve T12.    Performed by: Beltran Huitron, HOLGER            "

## 2024-07-15 NOTE — H&P
Pre-Op H&P  Domo Anderson  [id number]  1965      Chief complaint: Right groin pain      Subjective:  Patient is a 59 y.o.female presents for scheduled surgery by Dr. Rose. She anticipates a INGUINAL HERNIA REPAIR BILATERAL WITH MESH today. She reports bilateral inguinal hernia that have gotten larger over the past couple years. The right more painful than the left. She denies changes in bowel habits. CT scan in March 2020 showed bilateral fat-containing inguinal hernia.      Review of Systems:  Constitutional-- No fever, chills or sweats. No fatigue.  CV-- No chest pain, palpitation or syncope. +HLD  Resp-- No SOB, cough, hemoptysis  Skin--No rashes or lesions      Allergies:   Allergies   Allergen Reactions    Hydrocodone Itching         Home Meds:  Medications Prior to Admission   Medication Sig Dispense Refill Last Dose    albuterol (ACCUNEB) 1.25 MG/3ML nebulizer solution        atorvastatin (LIPITOR) 40 MG tablet Atorvastatin Calcium 40 MG Oral Tablet QTY: 90  Days: 90 Refills: 0  Written: 06/28/22 Patient Instructions:       azelastine (ASTEPRO) 0.15 % solution nasal spray instill 2 sprays into each nostril twice a day 30 mL 11     Cetirizine HCl 10 MG capsule Take  by mouth Daily.       fluticasone (FLONASE) 50 MCG/ACT nasal spray 2 sprays in each nostril bid 1 bottle 11     linaclotide (LINZESS) 145 MCG capsule capsule Take 1 capsule by mouth Daily.       montelukast (SINGULAIR) 10 MG tablet Take 1 tablet by mouth Every Night. 30 tablet 11     Multiple Vitamins-Minerals (MULTIVITAMIN ADULT EXTRA C PO) Take  by mouth.       PARoxetine CR (PAXIL-CR) 37.5 MG 24 hr tablet Take 1 tablet by mouth Every Morning.       PROAIR RESPICLICK 108 (90 Base) MCG/ACT inhaler Inhale 2 puffs Every 4 (Four) Hours As Needed for Wheezing or Shortness of Air. 1 inhaler 11     Semaglutide-Weight Management (WEGOVY SC)        traZODone (DESYREL) 50 MG tablet Take 1 tablet by mouth Every Night.        triamterene-hydrochlorothiazide (DYAZIDE) 37.5-25 MG per capsule Take 1 capsule by mouth Daily.            PMH:   Past Medical History:   Diagnosis Date    Anxiety and depression 2012    Asthma 1995    Breast injury 12/2022    fall on her chest    Chronic constipation 2013    Fibromyalgia 1995    Hyperlipidemia 2020    IBS (irritable bowel syndrome)     MIxed but prim. constipation    Osteoarthritis     Vocal cord dysfunction 1997     PSH:    Past Surgical History:   Procedure Laterality Date    ABDOMINOPLASTY  2014    AUGMENTATION MAMMAPLASTY Bilateral 2014    HIP SURGERY Left 2017    Repair of tendons, [facility]    HIP SURGERY  2019    SINUS SURGERY Right 2006    deviated septum    SINUS SURGERY  2020    TOTAL ABDOMINAL HYSTERECTOMY SALPINGO OOPHORECTOMY WITH ABDOMINAL SACROCOLPOPEXY Bilateral 2003    Along with mesh placement.  Dr. Lr done for abnormal bleeding    VAGINAL MESH REVISION  2008    Dr. Lr       Immunization History:  Influenza: UTD  Pneumococcal: UTD  Tetanus: UTD    Social History:   Tobacco:   Social History     Tobacco Use   Smoking Status Never    Passive exposure: Never   Smokeless Tobacco Never      Alcohol:     Social History     Substance and Sexual Activity   Alcohol Use Yes    Comment: Socially         Physical Exam: VS: /84  HR 70  RR 16  T 98.4  Sat 98%RA      General Appearance:    Alert, cooperative, no distress, appears stated age   Head:    Normocephalic, without obvious abnormality, atraumatic   Lungs:     Clear to auscultation bilaterally, respirations unlabored    Heart:   Regular rate and rhythm, S1 and S2 normal    Abdomen:    Soft without tenderness   Extremities:   Extremities normal, atraumatic, no cyanosis or edema   Skin:   Skin color, texture, turgor normal, no rashes or lesions   Neurologic:   Grossly intact     Results Review:     LABS:  Lab Results   Component Value Date    WBC 3.23 (L) 07/08/2024    HGB 13.7 07/08/2024    HCT 42.1 07/08/2024     MCV 90.5 07/08/2024     07/08/2024    NEUTROABS 2.68 10/27/2023    GLUCOSE 92 07/08/2024    BUN 13 07/08/2024    CREATININE 0.82 07/08/2024    EGFRIFNONA 70 11/05/2021     07/08/2024    K 3.9 07/08/2024     07/08/2024    CO2 29.0 07/08/2024    CALCIUM 9.0 07/08/2024    ALBUMIN 3.9 07/08/2024    AST 20 07/08/2024    ALT 23 07/08/2024    BILITOT 0.3 07/08/2024       RADIOLOGY:  Imaging Results (Last 72 Hours)       ** No results found for the last 72 hours. **            I reviewed the patient's new clinical results.    Cancer Staging (if applicable)  Cancer Patient: __ yes __no __unknown; If yes, clinical stage T:__ N:__M:__, stage group or __N/A      Impression: Bilateral inguinal hernia       Plan: INGUINAL HERNIA REPAIR BILATERAL WITH MESH      DANYEL Ríos   7/15/2024   07:01 EDT    I have reviewed the above note, my prior note(s), appropriate imaging, and labs.  I have again discussed the risks and benefits of bilateral inguinal hernia repair with mesh with the patient.  All of their questions have been answered.  They understand and wish to proceed with surgical intervention.    CBC  Results from last 7 days   Lab Units 07/08/24  1428   WBC 10*3/mm3 3.23*   HEMOGLOBIN g/dL 13.7   HEMATOCRIT % 42.1   PLATELETS 10*3/mm3 226       CMP  Results from last 7 days   Lab Units 07/08/24  1428   SODIUM mmol/L 139   POTASSIUM mmol/L 3.9   CHLORIDE mmol/L 103   CO2 mmol/L 29.0   BUN mg/dL 13   CREATININE mg/dL 0.82   CALCIUM mg/dL 9.0   BILIRUBIN mg/dL 0.3   ALK PHOS U/L 108   ALT (SGPT) U/L 23   AST (SGOT) U/L 20   GLUCOSE mg/dL 92

## 2024-07-15 NOTE — ANESTHESIA PROCEDURE NOTES
Airway  Urgency: elective    Date/Time: 7/15/2024 8:17 AM  Airway not difficult    General Information and Staff    Patient location during procedure: OR  CRNA/CAA: Beltran Huitron, CRNA    Indications and Patient Condition  Indications for airway management: airway protection    Preoxygenated: yes  MILS not maintained throughout  Mask difficulty assessment: 1 - vent by mask    Final Airway Details  Final airway type: endotracheal airway      Successful airway: ETT  Cuffed: yes   Successful intubation technique: direct laryngoscopy  Facilitating devices/methods: anterior pressure/BURP  Endotracheal tube insertion site: oral  Blade: Arash  Blade size: 3  ETT size (mm): 7.0  Cormack-Lehane Classification: grade IIb - view of arytenoids or posterior of glottis only  Placement verified by: chest auscultation and capnometry   Cuff volume (mL): 6  Measured from: lips  ETT/EBT  to lips (cm): 20  Number of attempts at approach: 1  Assessment: lips, teeth, and gum same as pre-op and atraumatic intubation    Additional Comments  Negative epigastric sounds, Breath sound equal bilaterally with symmetric chest rise and fall

## 2024-07-15 NOTE — ANESTHESIA PREPROCEDURE EVALUATION
Anesthesia Evaluation     Patient summary reviewed and Nursing notes reviewed   no history of anesthetic complications:   NPO Solid Status: > 8 hours  NPO Liquid Status: > 2 hours           Airway   Mallampati: II  TM distance: <3 FB  Neck ROM: full  No difficulty expected  Dental - normal exam     Pulmonary    (+) asthma,  (-) COPD, sleep apnea, not a smoker  Cardiovascular     (+) hyperlipidemia  (-) dysrhythmias, angina, orthopnea, POP, cardiac stents      Neuro/Psych  (+) psychiatric history Anxiety and Depression  (-) seizures, CVA  GI/Hepatic/Renal/Endo    (-) liver disease, no renal disease, diabetes, no thyroid disorder    Musculoskeletal     Abdominal    Substance History      OB/GYN          Other   arthritis,                 Anesthesia Plan    ASA 2     general with block     intravenous induction     Anesthetic plan, risks, benefits, and alternatives have been provided, discussed and informed consent has been obtained with: patient.    Plan discussed with CRNA.    CODE STATUS:

## 2024-07-15 NOTE — OP NOTE
General Surgery Operative Note    Domo Anderson  [id number]  1965    Date of Surgery:  7/15/2024 11:03 EDT    Pre-op Diagnosis: Bilateral inguinal hernia    Post-op Diagnosis: Bilateral indirect inguinal hernia with cord lipoma was    Procedure: Open inguinal hernia repair with Prolene mesh, bilateral    Surgeon: Joshua Rose MD    Anesthesia: General with Block    Fluids: 1 L of crystalloid    Estimated Blood Loss: Less than 25 mL    Urine Voided: Not recorded    Findings: Bilateral indirect inguinal hernia with cord lipomas, left greater than right    Complications: None apparent    History:   59-year-old lady presented to office with complaints of inguinal discomfort and had CT imaging consistent with bilateral inguinal hernia.       The risks and benefits of inguinal hernia repair with mesh were rehashed.  Our discussion included, but was not limited to: bleeding, infection, injury to adjacent viscera (round ligament, ilioinguinal/genitofemoral nerves), permanent deficits, chronic pain, seroma/hematoma formation, mesh complications (fistula formation etc.), recurrent hernia, and medical issues from a cardiopulmonary and deep venous thrombosis standpoint.  All questions were answered and she understands and wishes to proceed.    Procedure:      After informed consent the patient was taken to the operating room and placed in the supine position on the operating table.  General anesthesia was induced, bilateral TAP blocks were placed by anesthesia, the bilateral groins were then prepped and draped in the standard sterile fashion.  Appropriate antibiotic prophylaxis was administered to the patient.  An Ioban was placed on the skin.  A timeout was observed.        I began on the patient's left-hand side.  The anterior superior iliac spine and pubic tubercle were identified and an incision parallel to this was created.  I dissected down through Russ's fascia to the external oblique which was  incised in the direction of its fibers through the external ring.  Care was taken to identify the ilioinguinal nerve.  I obtained control of the round ligament sharply at the level of the pubic tubercle and encircled this with a Penrose drain.  I dissected back towards the level of the internal ring identifying a minute indirect inguinal hernia sac.  This was opened containing a small amount of fat which was reduced into the abdomen and this was then closed with a 3-0 Vicryl suture.  There was no evidence of a direct inguinal hernia.  The lipoma was  from the round ligament back to the internal ring and amputated with the Bovie.  This was discarded.  The Prolene mesh was then tacked to the pubic tubercle and run laterally along the inguinal ligament with Novafil suture.  A shutter in the mesh was created with adequate laxity for the round ligament.  Then medially, superiorly, and laterally the mesh was tucked under the external oblique.  The wound was copiously irrigated and meticulous hemostasis obtained.  The external oblique was closed in a running fashion with 3-0 Vicryl, Russ's fascia was reapproximated with interrupted 3-0 Vicryl, and the skin was closed with a running 4-0 subcuticular Monocryl.      I then went to the patient's right-hand side.  The anterior superior iliac spine and pubic tubercle were identified and an incision parallel to this was created.  I dissected down through Russ's fascia to the external oblique which was incised in the direction of its fibers through the external ring.  Care was taken to identify the ilioinguinal nerve.  I obtained control of the round ligament sharply at the level of the pubic tubercle and encircled this with a Penrose drain.  I dissected back towards the level of the internal ring identifying a large lipoma and associated indirect inguinal hernia sac.  The inguinal hernia sac was freed back to the level of the internal ring and opened.  This contained  fat which was reduced into the abdomen.  The hernia sac was suture-ligated at the level of the internal ring and amputated.  This was passed off the specimen.  There was a large lipoma associated with the round ligament which was dissected free and amputated at its base.  This was also passed off the specimen.  A similar to the other side, there was no evidence of a direct inguinal hernia.  I then fashioned the Prolene mesh to the pubic tubercle and ran laterally along the inguinal ligament with Novafil suture.  A shutter in the mesh was created with adequate laxity for the round ligament.  Then medially, superiorly, and laterally the mesh was tucked under the external oblique.  The wound was copiously irrigated and meticulous hemostasis obtained.  The external oblique was closed in a running fashion with 3-0 Vicryl, Russ's fascia was reapproximated with interrupted 3-0 Vicryl, and the skin was closed with a running 4-0 subcuticular Monocryl.      Skin glue and Coverderms were used as dressings.  All lap and needle counts were reported as correct at the end of the procedure ×2.  The patient was then transferred to the PACU in stable condition.    Joshua Rose MD     Date: 7/15/2024  Time: 11:03 EDT

## 2024-07-16 LAB
CYTO UR: NORMAL
LAB AP CASE REPORT: NORMAL
LAB AP CLINICAL INFORMATION: NORMAL
PATH REPORT.FINAL DX SPEC: NORMAL
PATH REPORT.GROSS SPEC: NORMAL

## 2024-09-16 ENCOUNTER — HOSPITAL ENCOUNTER (OUTPATIENT)
Dept: MAMMOGRAPHY | Facility: HOSPITAL | Age: 59
Discharge: HOME | End: 2024-09-16
Admitting: RADIOLOGY
Payer: COMMERCIAL

## 2024-09-16 DIAGNOSIS — R92.8 ABNORMAL MAMMOGRAM: ICD-10-CM

## 2024-09-16 PROCEDURE — G0279 TOMOSYNTHESIS, MAMMO: HCPCS

## 2024-09-16 PROCEDURE — 77066 DX MAMMO INCL CAD BI: CPT

## 2024-09-16 PROCEDURE — 77062 BREAST TOMOSYNTHESIS BI: CPT | Performed by: RADIOLOGY

## 2024-09-16 PROCEDURE — 77066 DX MAMMO INCL CAD BI: CPT | Performed by: RADIOLOGY

## (undated) DEVICE — SUT SILK 3/0 TIES 18IN A184H

## (undated) DEVICE — GOWN,NON-REINFORCED,SIRUS,SET IN SLV,XL: Brand: MEDLINE

## (undated) DEVICE — 450 ML BOTTLE OF 0.05% CHLORHEXIDINE GLUCONATE IN 99.95% STERILE WATER FOR IRRIGATION, USP AND APPLICATOR.: Brand: IRRISEPT ANTIMICROBIAL WOUND LAVAGE

## (undated) DEVICE — ELECTRD BLD EZ CLN STD 2.5IN

## (undated) DEVICE — DRN PENRS SIL 1/4X18IN LF STRL

## (undated) DEVICE — SPONGE,DISSECTOR,K,XRAY,9/16"X1/4",STRL: Brand: MEDLINE

## (undated) DEVICE — 3M™ IOBAN™ 2 ANTIMICROBIAL INCISE DRAPE 6650EZ: Brand: IOBAN™ 2

## (undated) DEVICE — TRAP FLD MINIVAC MEGADYNE 100ML

## (undated) DEVICE — GLV SURG SENSICARE PI LF PF 7.5

## (undated) DEVICE — DRSNG WND BORDR/ADHS NONADHR/GZ LF 4X4IN STRL

## (undated) DEVICE — SUT VIC 0 SH 27IN J418H

## (undated) DEVICE — PENCL SMOKE/EVAC MEGADYNE TELESCP 10FT

## (undated) DEVICE — SUT MNCRYL PLS ANTIB UD 3/0 PS2 27IN

## (undated) DEVICE — GLV SURG PREMIERPRO MIC LTX PF SZ8 BRN

## (undated) DEVICE — GLV SURG SENSICARE PI LF PF 7.0

## (undated) DEVICE — SUT MNCRYL PLS ANTIB UD 4/0 PS2 18IN

## (undated) DEVICE — ANTIBACTERIAL UNDYED BRAIDED (POLYGLACTIN 910), SYNTHETIC ABSORBABLE SUTURE: Brand: COATED VICRYL

## (undated) DEVICE — APPL CHLORAPREP TINTED 26ML TEAL

## (undated) DEVICE — GLV SURG PREMIERPRO MIC LTX PF SZ7 BRN

## (undated) DEVICE — ADHS SKIN PREMIERPRO EXOFIN TOPICAL HI/VISC .5ML

## (undated) DEVICE — SUT VIC PLS CTD ANTIB 2/0 18IN VCP111G

## (undated) DEVICE — GLV SURG PREMIERPRO MIC LTX PF SZ7.5 BRN

## (undated) DEVICE — SUT MNCRYL PLS ANTIB UD 4/0 PC3 18IN

## (undated) DEVICE — PK MINOR SPLT 10

## (undated) DEVICE — MONOFILAMENT POLYBUTESTER: Brand: NOVAFIL